# Patient Record
Sex: FEMALE | Race: WHITE | Employment: OTHER | URBAN - METROPOLITAN AREA
[De-identification: names, ages, dates, MRNs, and addresses within clinical notes are randomized per-mention and may not be internally consistent; named-entity substitution may affect disease eponyms.]

---

## 2017-03-15 ENCOUNTER — OFFICE VISIT (OUTPATIENT)
Dept: PEDIATRICS | Facility: CLINIC | Age: 57
End: 2017-03-15
Payer: COMMERCIAL

## 2017-03-15 VITALS
HEART RATE: 71 BPM | SYSTOLIC BLOOD PRESSURE: 138 MMHG | WEIGHT: 188 LBS | HEIGHT: 65 IN | DIASTOLIC BLOOD PRESSURE: 84 MMHG | TEMPERATURE: 97.8 F | BODY MASS INDEX: 31.32 KG/M2 | OXYGEN SATURATION: 98 %

## 2017-03-15 DIAGNOSIS — L98.9 LESION OF SKIN OF FACE: Primary | ICD-10-CM

## 2017-03-15 DIAGNOSIS — N95.1 MENOPAUSAL SYNDROME (HOT FLASHES): ICD-10-CM

## 2017-03-15 PROCEDURE — 99214 OFFICE O/P EST MOD 30 MIN: CPT | Performed by: PEDIATRICS

## 2017-03-15 NOTE — PATIENT INSTRUCTIONS
Hot flashes - ok to try over the counter remedies (black cohash, primrose, flaxseed).    Derm referral below.

## 2017-03-15 NOTE — PROGRESS NOTES
"  SUBJECTIVE:                                                    Damion Stanley is a 56 year old female who presents to clinic today for the following health issues:    Derm Problem      Duration: x 2 mos    Description (location/character/radiation): brownish raised spots on cheeks and forehead - flesh colored, not itching, seems to be spreading    Intensity:  mild    Accompanying signs and symptoms: None    History (similar episodes/previous evaluation): None    Precipitating or alleviating factors: None, no change in facial products - uses daily regimen with rodan and fields.  Has also tried pore therapies, but not helping.    Therapies tried and outcome: None     Hot flashes - previously on HRT and felt great.  We have weaned off.  Still having hot flashes and hard to lose weight, exercising 6 times per week, low carb/low sugar diet.     Problem list and histories reviewed & adjusted, as indicated.  Additional history: as documented    Reviewed and updated as needed this visit by clinical staff       Reviewed and updated as needed this visit by Provider         ROS:  Constitutional, HEENT, cardiovascular, pulmonary, gi and gu systems are negative, except as otherwise noted.    OBJECTIVE:                                                    /84  Pulse 71  Temp 97.8  F (36.6  C) (Oral)  Ht 5' 4.5\" (1.638 m)  Wt 188 lb (85.3 kg)  LMP 01/07/2006  SpO2 98%  Breastfeeding? No  BMI 31.77 kg/m2  Body mass index is 31.77 kg/(m^2).  SKIN: 2mm flesh colored papules, crop of three right cheek, a few along forehead hairline and one on left chin    Diagnostic Test Results:  none      ASSESSMENT/PLAN:                                                        1. Lesion of skin of face  Do not appear ominous in any way, most c/w small sebaceous cyst - offered watchful waiting vs further dermatology consult.  - DERMATOLOGY REFERRAL    2. Menopausal syndrome (hot flashes)  Offer non HRT treatment (SSRI) - patient declines.  Would " like to try over the counter remedies - patient low risk, ok to try these.      Patient Instructions   Hot flashes - ok to try over the counter remedies (black cohash, primrose, flaxseed).    Derm referral below.      Anushka Moctezuma MD  [facility]

## 2017-03-15 NOTE — MR AVS SNAPSHOT
After Visit Summary   3/15/2017    Damion Stanley    MRN: [id number]           Patient Information     Date Of Birth          1960        Visit Information        Provider Department      3/15/2017 8:40 AM Anushka Moctezuma MD [facility]        Today's Diagnoses     Lesion of skin of face    -  1      Care Instructions    Hot flashes - ok to try over the counter remedies (black cohash, primrose, flaxseed).    Derm referral below.        Follow-ups after your visit        Additional Services     DERMATOLOGY REFERRAL       Your provider has referred you to: Dr Armenta at [facility] [phone]    Please be aware that coverage of these services is subject to the terms and limitations of your health insurance plan.  Call member services at your health plan with any benefit or coverage questions.      Please bring the following with you to your appointment:    (1) Any X-Rays, CTs or MRIs which have been performed.  Contact the facility where they were done to arrange for  prior to your scheduled appointment.    (2) List of current medications  (3) This referral request   (4) Any documents/labs given to you for this referral                  Who to contact     If you have questions or need follow up information about today's clinic visit or your schedule please contact [facility] directly at [phone].  Normal or non-critical lab and imaging results will be communicated to you by MyChart, letter or phone within 4 business days after the clinic has received the results. If you do not hear from us within 7 days, please contact the clinic through MyChart or phone. If you have a critical or abnormal lab result, we will notify you by phone as soon as possible.  Submit refill requests through [employer] or call your pharmacy and they will forward the refill request to us. Please allow 3 business days for your refill to be completed.          Additional Information  "About Your Visit        MyChart Information     [employer] gives you secure access to your electronic health record. If you see a primary care provider, you can also send messages to your care team and make appointments. If you have questions, please call your primary care clinic.  If you do not have a primary care provider, please call [phone] and they will assist you.        Care EveryWhere ID     This is your Care EveryWhere ID. This could be used by other organizations to access your [place] medical records  [id number]        Your Vitals Were     Pulse Temperature Height Last Period Pulse Oximetry Breastfeeding?    71 97.8  F (36.6  C) (Oral) 5' 4.5\" (1.638 m) 01/07/2006 98% No    BMI (Body Mass Index)                   31.77 kg/m2            Blood Pressure from Last 3 Encounters:   03/15/17 (!) 138/92   05/26/16 138/80   02/18/16 (!) 132/94    Weight from Last 3 Encounters:   03/15/17 188 lb (85.3 kg)   05/26/16 184 lb (83.5 kg)   02/18/16 185 lb 3.2 oz (84 kg)              We Performed the Following     DERMATOLOGY REFERRAL        Primary Care Provider Office Phone # Fax #    Anushka Moctezuma [phone]       [facility] [address] [place] [address]        Thank you!     Thank you for choosing [facility]  for your care. Our goal is always to provide you with excellent care. Hearing back from our patients is one way we can continue to improve our services. Please take a few minutes to complete the written survey that you may receive in the mail after your visit with us. Thank you!             Your Updated Medication List - Protect others around you: Learn how to safely use, store and throw away your medicines at www.disposemymeds.org.          This list is accurate as of: 3/15/17  9:10 AM.  Always use your most recent med list.                   Brand Name Dispense Instructions for use    amLODIPine 5 MG tablet    NORVASC    90 tablet    Take 1 " tablet (5 mg) by mouth daily       calcium carbonate 600 MG tablet   Generic drug:  calcium      Take 1 tablet by mouth. Take one tablet daily with Breakfast       MULTI-VITAMIN DAILY PO      Take  by mouth.       Vitamin D-3 5000 UNITS Tabs      Take 1 tablet by mouth. One tablet daily

## 2017-03-15 NOTE — NURSING NOTE
"Chief Complaint   Patient presents with     Derm Problem       Initial BP (!) 138/92 (BP Location: Right arm, Patient Position: Chair, Cuff Size: Adult Regular)  Pulse 71  Temp 97.8  F (36.6  C) (Oral)  Ht 5' 4.5\" (1.638 m)  Wt 188 lb (85.3 kg)  LMP 01/07/2006  SpO2 98%  Breastfeeding? No  BMI 31.77 kg/m2 Estimated body mass index is 31.77 kg/(m^2) as calculated from the following:    Height as of this encounter: 5' 4.5\" (1.638 m).    Weight as of this encounter: 188 lb (85.3 kg).  Medication Reconciliation: complete  "

## 2017-04-10 ENCOUNTER — OFFICE VISIT (OUTPATIENT)
Dept: PEDIATRICS | Facility: CLINIC | Age: 57
End: 2017-04-10
Payer: COMMERCIAL

## 2017-04-10 VITALS
DIASTOLIC BLOOD PRESSURE: 78 MMHG | OXYGEN SATURATION: 96 % | HEIGHT: 65 IN | TEMPERATURE: 98.3 F | SYSTOLIC BLOOD PRESSURE: 120 MMHG | BODY MASS INDEX: 31.67 KG/M2 | HEART RATE: 82 BPM | WEIGHT: 190.1 LBS

## 2017-04-10 DIAGNOSIS — N63.0 LUMP OR MASS IN BREAST: Primary | ICD-10-CM

## 2017-04-10 PROCEDURE — 99213 OFFICE O/P EST LOW 20 MIN: CPT | Performed by: INTERNAL MEDICINE

## 2017-04-10 NOTE — MR AVS SNAPSHOT
After Visit Summary   4/10/2017    Damion Stanley    MRN: [id number]           Patient Information     Date Of Birth          1960        Visit Information        Provider Department      4/10/2017 10:20 AM Cam Herring MD [facility]        Today's Diagnoses     Lump or mass in breast    -  1      Care Instructions    If you are not contacted you can call radiology [phone]          Follow-ups after your visit        Your next 10 appointments already scheduled     May 02, 2017  9:30 AM CDT   New Visit with Michelle Armenta MD   [facility]    [address]  [address]  [facility] [address] [phone]              Future tests that were ordered for you today     Open Future Orders        Priority Expected Expires Ordered    MA Diagnostic Left w/Sravan Routine  4/10/2018 4/10/2017            Who to contact     If you have questions or need follow up information about today's clinic visit or your schedule please contact [facility] directly at [phone].  Normal or non-critical lab and imaging results will be communicated to you by MyChart, letter or phone within 4 business days after the clinic has received the results. If you do not hear from us within 7 days, please contact the clinic through [employer] or phone. If you have a critical or abnormal lab result, we will notify you by phone as soon as possible.  Submit refill requests through [employer] or call your pharmacy and they will forward the refill request to us. Please allow 3 business days for your refill to be completed.          Additional Information About Your Visit        MyChart Information     [employer] gives you secure access to your electronic health record. If you see a primary care provider, you can also send messages to your care team and make appointments. If you have questions, please call your primary care clinic.  If you do not  "have a primary care provider, please call [phone] and they will assist you.        Care EveryWhere ID     This is your Care EveryWhere ID. This could be used by other organizations to access your [place] medical records  [id number]        Your Vitals Were     Pulse Temperature Height Last Period Pulse Oximetry BMI (Body Mass Index)    82 98.3  F (36.8  C) (Oral) 5' 4.5\" (1.638 m) 01/07/2006 96% 32.13 kg/m2       Blood Pressure from Last 3 Encounters:   04/10/17 120/78   03/15/17 138/84   05/26/16 138/80    Weight from Last 3 Encounters:   04/10/17 190 lb 1.6 oz (86.2 kg)   03/15/17 188 lb (85.3 kg)   05/26/16 184 lb (83.5 kg)               Primary Care Provider Office Phone # Fax #    Anushka Moctezuma [phone]       [facility] [address] [place] [address] [address] [place] [address]        Thank you!     Thank you for choosing [facility]  for your care. Our goal is always to provide you with excellent care. Hearing back from our patients is one way we can continue to improve our services. Please take a few minutes to complete the written survey that you may receive in the mail after your visit with us. Thank you!             Your Updated Medication List - Protect others around you: Learn how to safely use, store and throw away your medicines at www.disposemymeds.org.          This list is accurate as of: 4/10/17 10:44 AM.  Always use your most recent med list.                   Brand Name Dispense Instructions for use    amLODIPine 5 MG tablet    NORVASC    90 tablet    Take 1 tablet (5 mg) by mouth daily       calcium carbonate 600 MG tablet   Generic drug:  calcium      Take 1 tablet by mouth. Take one tablet daily with Breakfast       MULTI-VITAMIN DAILY PO      Take  by mouth.       Vitamin D-3 5000 UNITS Tabs      Take 1 tablet by mouth. One tablet daily         "

## 2017-04-10 NOTE — NURSING NOTE
"Chief Complaint   Patient presents with     Breast Problem       Initial /78 (Cuff Size: Adult Large)  Pulse 82  Temp 98.3  F (36.8  C) (Oral)  Ht 5' 4.5\" (1.638 m)  Wt 190 lb 1.6 oz (86.2 kg)  LMP 01/07/2006  SpO2 96%  BMI 32.13 kg/m2 Estimated body mass index is 32.13 kg/(m^2) as calculated from the following:    Height as of this encounter: 5' 4.5\" (1.638 m).    Weight as of this encounter: 190 lb 1.6 oz (86.2 kg).  Medication Reconciliation: zia Quinteros   "

## 2017-04-10 NOTE — PROGRESS NOTES
"  SUBJECTIVE:                                                    Damion Stanley is a 56 year old female who presents to clinic today for the following health issues:      Concern - Left breast lump     Onset: yesterday    Description:   lump    Progression of Symptoms:  same    Accompanying Signs & Symptoms:  None       Previous history of similar problem:   Had cyst on left breast previously.     Precipitating factors:   Worsened by: none    Alleviating factors:  Improved by: none       Problem list and histories reviewed & adjusted, as indicated.      OBJECTIVE:                                                    /78 (Cuff Size: Adult Large)  Pulse 82  Temp 98.3  F (36.8  C) (Oral)  Ht 5' 4.5\" (1.638 m)  Wt 190 lb 1.6 oz (86.2 kg)  LMP 01/07/2006  SpO2 96%  BMI 32.13 kg/m2  Body mass index is 32.13 kg/(m^2).  GEN: no distress  SKIN: no rashes or suspicious lesions noted  BREAST: Right breast w/o masses or skin lesions. No adenopathy noted. Left breast with rubbery mass in the lower aspect just medial to midline. Mobile. Nontender. No discharge. No associated skin lesions.        ASSESSMENT/PLAN:                                                        ICD-10-CM    1. Lump or mass in breast N63 MA Diagnostic Left w/Sravan      Clinically most c/w fibrous change or possibly cyst.     Mammogram/US ordered:    Recent Results (from the past 744 hour(s))   MA Diagnostic Left w/Sravan    Narrative    MAMMOGRAPHY DIAGNOSTIC LEFT WITH TOMOSYNTHESIS, DIGITAL w/CAD;      TARGETED ULTRASOUND, LEFT BREAST - 4/12/2017 1:46 PM    HISTORY: New left lower inner breast lump. Family history of breast  cancer in her mother at age 54.     COMPARISON:  Mammograms dated 10/5/2016, 4/2/2015, 1/16/2014 and  1/9/2013.    BREAST DENSITY: Scattered fibroglandular densities.    FINDINGS: No mammographic findings of concern for malignancy.    Targeted ultrasound of the lower inner left breast at the site of the  palpable finding " demonstrates no sonographic abnormality to suggest  malignancy.       Impression    IMPRESSION: BI-RADS CATEGORY: 1 -  NEGATIVE.    RECOMMENDED FOLLOW-UP: Clinical Follow-up. Annual mammography.    I discussed the findings and recommendations with the patient at the  time of the exam.    TORI MAIN MD   US Breast Left    Narrative    MAMMOGRAPHY DIAGNOSTIC LEFT WITH TOMOSYNTHESIS, DIGITAL w/CAD;      TARGETED ULTRASOUND, LEFT BREAST - 4/12/2017 1:46 PM    HISTORY: New left lower inner breast lump. Family history of breast  cancer in her mother at age 54.     COMPARISON:  Mammograms dated 10/5/2016, 4/2/2015, 1/16/2014 and  1/9/2013.    BREAST DENSITY: Scattered fibroglandular densities.    FINDINGS: No mammographic findings of concern for malignancy.    Targeted ultrasound of the lower inner left breast at the site of the  palpable finding demonstrates no sonographic abnormality to suggest  malignancy.       Impression    IMPRESSION: BI-RADS CATEGORY: 1 -  NEGATIVE.    RECOMMENDED FOLLOW-UP: Clinical Follow-up. Annual mammography.    I discussed the findings and recommendations with the patient at the  time of the exam.    MD Cam MATTHEW MD  [facility]

## 2017-04-12 ENCOUNTER — HOSPITAL ENCOUNTER (OUTPATIENT)
Dept: MAMMOGRAPHY | Facility: CLINIC | Age: 57
Discharge: HOME | End: 2017-04-12
Attending: INTERNAL MEDICINE | Admitting: INTERNAL MEDICINE
Payer: COMMERCIAL

## 2017-04-12 ENCOUNTER — HOSPITAL ENCOUNTER (OUTPATIENT)
Dept: MAMMOGRAPHY | Facility: CLINIC | Age: 57
End: 2017-04-12
Attending: INTERNAL MEDICINE
Payer: COMMERCIAL

## 2017-04-12 DIAGNOSIS — N63.0 LUMP OR MASS IN BREAST: ICD-10-CM

## 2017-04-12 PROCEDURE — 76642 ULTRASOUND BREAST LIMITED: CPT | Mod: LT

## 2017-04-12 PROCEDURE — G0279 TOMOSYNTHESIS, MAMMO: HCPCS

## 2017-04-24 ENCOUNTER — MYC MEDICAL ADVICE (OUTPATIENT)
Dept: PEDIATRICS | Facility: CLINIC | Age: 57
End: 2017-04-24

## 2017-04-24 DIAGNOSIS — N95.1 MENOPAUSAL SYNDROME (HOT FLASHES): Primary | ICD-10-CM

## 2017-04-24 RX ORDER — CITALOPRAM HYDROBROMIDE 20 MG/1
20 TABLET ORAL DAILY
Qty: 90 TABLET | Refills: 1 | Status: SHIPPED | OUTPATIENT
Start: 2017-04-24 | End: 2018-09-19

## 2017-05-02 ENCOUNTER — OFFICE VISIT (OUTPATIENT)
Dept: DERMATOLOGY | Facility: CLINIC | Age: 57
End: 2017-05-02
Payer: COMMERCIAL

## 2017-05-02 DIAGNOSIS — L73.8 SENILE SEBACEOUS GLAND HYPERPLASIA: ICD-10-CM

## 2017-05-02 DIAGNOSIS — D23.9 DERMAL NEVUS: ICD-10-CM

## 2017-05-02 DIAGNOSIS — L01.00 IMPETIGO: Primary | ICD-10-CM

## 2017-05-02 PROCEDURE — 99243 OFF/OP CNSLTJ NEW/EST LOW 30: CPT | Performed by: DERMATOLOGY

## 2017-05-02 RX ORDER — MUPIROCIN 20 MG/G
OINTMENT TOPICAL
Qty: 22 G | Refills: 1 | Status: SHIPPED | OUTPATIENT
Start: 2017-05-02 | End: 2018-09-19

## 2017-05-02 NOTE — NURSING NOTE
"Chief Complaint   Patient presents with     Consult     Small lesions on face started after Tresa, lesions on face are spreading all over, one spot scabs over never fully heal, tx: none        Initial LMP 01/07/2006 Estimated body mass index is 32.13 kg/(m^2) as calculated from the following:    Height as of 4/10/17: 5' 4.5\" (163.8 cm).    Weight as of 4/10/17: 190 lb 1.6 oz (86.2 kg).  Medication Reconciliation: complete   Katharine Valente MA      "

## 2017-05-02 NOTE — PATIENT INSTRUCTIONS
Pediatric Dermatology  [facility]  [address]  1st [place]  [address]  Phone: [phone]    Pediatric & Adult Dermatology  [facility]  [address]   2nd Floor  [facility] [address]  [phone]                  General information: Dr. Michelle Armenta is a board-certified dermatologist with subspecialty certification in pediatric dermatology.     Scheduling and Nurse Triage: Dr. Armenta sees pediatric patients on Mondays in [place] and adult and pediatric patients on Tuesdays in [place]. The remainder of the week she practices at the [facility]. Please call the above phone numbers to schedule or to talk to a nurse.     -For scheduling at the [place] or [place] locations, or to talk to the triage nurse please call the above phone number at the clinic where you were seen.     -For medication refills, please call your pharmacy.

## 2017-05-02 NOTE — MR AVS SNAPSHOT
After Visit Summary   5/2/2017    Damion Stanley    MRN: [id number]           Patient Information     Date Of Birth          1960        Visit Information        Provider Department      5/2/2017 9:30 AM Michelle Armenta MD [facility]        Today's Diagnoses     Impetigo    -  1    Senile sebaceous gland hyperplasia        Dermal nevus          Care Instructions                    Pediatric Dermatology  [facility]  [address]  1st [place]  [address]  Phone: [phone]    Pediatric & Adult Dermatology  [facility]  [address]  2nd Floor  [facility] [address]  [phone]                  General information: Dr. Michelle Armenta is a board-certified dermatologist with subspecialty certification in pediatric dermatology.     Scheduling and Nurse Triage: Dr. Armenta sees pediatric patients on Mondays in [place] and adult and pediatric patients on Tuesdays in [place]. The remainder of the week she practices at the [facility]. Please call the above phone numbers to schedule or to talk to a nurse.     -For scheduling at the [place] or [place] locations, or to talk to the triage nurse please call the above phone number at the clinic where you were seen.     -For medication refills, please call your pharmacy.                   Follow-ups after your visit        Who to contact     If you have questions or need follow up information about today's clinic visit or your schedule please contact [facility] directly at [phone].  Normal or non-critical lab and imaging results will be communicated to you by MyChart, letter or phone within 4 business days after the clinic has received the results. If you do not hear from us within 7 days, please contact the clinic through MyChart or phone. If you have a critical or abnormal lab result,  we will notify you by phone as soon as possible.  Submit refill requests through [employer] or call your pharmacy and they will forward the refill request to us. Please allow 3 business days for your refill to be completed.          Additional Information About Your Visit        [employer] Information     [employer] gives you secure access to your electronic health record. If you see a primary care provider, you can also send messages to your care team and make appointments. If you have questions, please call your primary care clinic.  If you do not have a primary care provider, please call [phone] and they will assist you.        Care EveryWhere ID     This is your Care EveryWhere ID. This could be used by other organizations to access your [place] medical records  [id number]        Your Vitals Were     Last Period                   01/07/2006            Blood Pressure from Last 3 Encounters:   04/10/17 120/78   03/15/17 138/84   05/26/16 138/80    Weight from Last 3 Encounters:   04/10/17 86.2 kg (190 lb 1.6 oz)   03/15/17 85.3 kg (188 lb)   05/26/16 83.5 kg (184 lb)              Today, you had the following     No orders found for display         Today's Medication Changes          These changes are accurate as of: 5/2/17 11:59 PM.  If you have any questions, ask your nurse or doctor.               Start taking these medicines.        Dose/Directions    mupirocin 2 % ointment   Commonly known as:  BACTROBAN   Used for:  Impetigo   Started by:  Michelle Armenta MD        Use nightly to chin until healed.   Quantity:  22 g   Refills:  1            Where to get your medicines      These medications were sent to [id number] - [place], [place] - [address] [place] [address] AT [address]  [address], [place] [address]     Phone:  [phone]     mupirocin 2 % ointment                Primary Care Provider Office Phone # Fax #    Anushka Moctezuma [phone]       [place]  [facility] [place] [address] [place] [address]        Thank you!     Thank you for choosing [facility]  for your care. Our goal is always to provide you with excellent care. Hearing back from our patients is one way we can continue to improve our services. Please take a few minutes to complete the written survey that you may receive in the mail after your visit with us. Thank you!             Your Updated Medication List - Protect others around you: Learn how to safely use, store and throw away your medicines at www.disposemymeds.org.          This list is accurate as of: 5/2/17 11:59 PM.  Always use your most recent med list.                   Brand Name Dispense Instructions for use    amLODIPine 5 MG tablet    NORVASC    90 tablet    Take 1 tablet (5 mg) by mouth daily       calcium carbonate 600 MG tablet   Generic drug:  calcium      Take 1 tablet by mouth. Take one tablet daily with Breakfast       citalopram 20 MG tablet    celeXA    90 tablet    Take 1 tablet (20 mg) by mouth daily       MULTI-VITAMIN DAILY PO      Take  by mouth.       mupirocin 2 % ointment    BACTROBAN    22 g    Use nightly to chin until healed.       Vitamin D-3 5000 UNITS Tabs      Take 1 tablet by mouth. One tablet daily

## 2017-05-02 NOTE — LETTER
5/2/2017      RE: Damion Stanley  [address]  [place] [address]       DERMATOLOGY CLINIC VISIT NOTE      CHIEF COMPLAINT:  Bumps on face.      DERMATOLOGY PROBLEM LIST:   1.  Sebaceous hyperplasia.   2.  Dermal nevi.   3.  Nonhealing ulcer on chin.      HISTORY OF PRESENT ILLNESS:  Ms. Stanley a 56-year-old female seen in Dermatology Clinic today at the request of Dr. Anushka Moctezuma for initial evaluation of several face lesions.  She states that over the last several years she has developed pink and tan colored bumps across the forehead, lateral cheeks and chin.  The lesions continue to form.  At one point in time, she scratched a lesion on the chin and it has not healed over the last few months.  She denies history of skin cancer or precancers.  She has no past history of lesions being removed from her skin.      The bumps on her face are asymptomatic.  She has been using a [employer] product to help cleanse her face, but this has not improved the appearance of the bumpy texture.      Patient Active Problem List   Diagnosis     Pain in joint, upper arm     Headache     CARDIOVASCULAR SCREENING; LDL GOAL LESS THAN 160     Menopause     Elevated blood pressure (not hypertension)     Vitamin D deficiency     Hormone replacement therapy (HRT)     Hypertension goal BP (blood pressure) < 140/90       Allergies   Allergen Reactions     Hydrochlorothiazide      Sweating, insomnia     Lisinopril Cough     Penicillins      rash     Tylenol W/Codeine [Acetaminophen-Codeine]      Stomach Upset         Current Outpatient Prescriptions   Medication     mupirocin (BACTROBAN) 2 % ointment     citalopram (CELEXA) 20 MG tablet     amLODIPine (NORVASC) 5 MG tablet     Cholecalciferol (VITAMIN D-3) 5000 UNITS TABS     Multiple Vitamin (MULTI-VITAMIN DAILY PO)     calcium (CALCIUM 600) 600 MG tablet     No current facility-administered medications for this visit.           REVIEW OF SYSTEMS:  Feeling well without  additional skin concerns.      SOCIAL HISTORY:  The patient is .  She lives in [place].  She is self employed.      PHYSICAL EXAMINATION:   GENERAL:  The patient is a healthy-appearing 56-year-old female in no distress.   HEENT:  Conjunctivae are clear.   PULMONARY:  Breathing comfortably on room air.   ABDOMEN:  No abdominal distention.   SKIN:  Examination was  focused on the face, neck and dorsal hands today.  Examination of the face showed approximately 20 white and yellow smooth 2-4 mm papules, many with central dell and some with keratinous plugs centrally.   -- Scattered, firm, flesh-colored papules on the lateral cheeks.   -- Chin with superficial erosion and yellow crusting.   -- Waxy 4 mm papule on right lateral cheek.      ASSESSMENT AND PLAN:   1.  Sebaceous hyperplasia; discussed that there was likely a hereditary component and history of sun exposure over time may also contribute to the formation of dilated sebaceous glands in the skin.  Treatment options would include chemical peel versus electrocautery.  Noted that treatment options would remove the skin elevation but would leave a pink kamryn behind.  Treatments would be considered cosmetic and would require an out-of-pocket expense.  The patient will consider options and reschedule pending her decision.     2.  Seborrheic keratosis on the cheek; benign hyperkeratotic papule.  No treatment advised.     3.  Dermal nevi on the face; no lesions of concern today.  Noted that normal change in nevi would be elevation, loss of pigmentation over time.      Ms. Stanley to return to Dermatology Clinic on an as-needed basis.      Thank you for involving me in was Ms. Stanley's care.     Michelle Armenta MD  Dermatology Staff       cc:   Anushka Moctezuma MD   [facility]   [address] [place] [address]        D: 05/02/2017 12:46   T: 05/02/2017 18:45   MT: SANDY      Name:     LYNDSEY STANLEY   MRN:      [id number]        Account:       [id number]   :      1960           Service Date: 2017      Document: [id number]

## 2017-05-02 NOTE — PROGRESS NOTES
DERMATOLOGY CLINIC VISIT NOTE      CHIEF COMPLAINT:  Bumps on face.      DERMATOLOGY PROBLEM LIST:   1.  Sebaceous hyperplasia.   2.  Dermal nevi.   3.  Nonhealing ulcer on chin.      HISTORY OF PRESENT ILLNESS:  Ms. Stanley a 56-year-old female seen in Dermatology Clinic today at the request of Dr. Anushka Moctezuma for initial evaluation of several face lesions.  She states that over the last several years she has developed pink and tan colored bumps across the forehead, lateral cheeks and chin.  The lesions continue to form.  At one point in time, she scratched a lesion on the chin and it has not healed over the last few months.  She denies history of skin cancer or precancers.  She has no past history of lesions being removed from her skin.      The bumps on her face are asymptomatic.  She has been using a [employer] product to help cleanse her face, but this has not improved the appearance of the bumpy texture.      Patient Active Problem List   Diagnosis     Pain in joint, upper arm     Headache     CARDIOVASCULAR SCREENING; LDL GOAL LESS THAN 160     Menopause     Elevated blood pressure (not hypertension)     Vitamin D deficiency     Hormone replacement therapy (HRT)     Hypertension goal BP (blood pressure) < 140/90       Allergies   Allergen Reactions     Hydrochlorothiazide      Sweating, insomnia     Lisinopril Cough     Penicillins      rash     Tylenol W/Codeine [Acetaminophen-Codeine]      Stomach Upset         Current Outpatient Prescriptions   Medication     mupirocin (BACTROBAN) 2 % ointment     citalopram (CELEXA) 20 MG tablet     amLODIPine (NORVASC) 5 MG tablet     Cholecalciferol (VITAMIN D-3) 5000 UNITS TABS     Multiple Vitamin (MULTI-VITAMIN DAILY PO)     calcium (CALCIUM 600) 600 MG tablet     No current facility-administered medications for this visit.           REVIEW OF SYSTEMS:  Feeling well without additional skin concerns.      SOCIAL HISTORY:  The patient is .  She lives in  [place].  She is self employed.      PHYSICAL EXAMINATION:   GENERAL:  The patient is a healthy-appearing 56-year-old female in no distress.   HEENT:  Conjunctivae are clear.   PULMONARY:  Breathing comfortably on room air.   ABDOMEN:  No abdominal distention.   SKIN:  Examination was  focused on the face, neck and dorsal hands today.  Examination of the face showed approximately 20 white and yellow smooth 2-4 mm papules, many with central dell and some with keratinous plugs centrally.   -- Scattered, firm, flesh-colored papules on the lateral cheeks.   -- Chin with superficial erosion and yellow crusting.   -- Waxy 4 mm papule on right lateral cheek.      ASSESSMENT AND PLAN:   1.  Sebaceous hyperplasia; discussed that there was likely a hereditary component and history of sun exposure over time may also contribute to the formation of dilated sebaceous glands in the skin.  Treatment options would include chemical peel versus electrocautery.  Noted that treatment options would remove the skin elevation but would leave a pink kamryn behind.  Treatments would be considered cosmetic and would require an out-of-pocket expense.  The patient will consider options and reschedule pending her decision.     2.  Seborrheic keratosis on the cheek; benign hyperkeratotic papule.  No treatment advised.     3.  Dermal nevi on the face; no lesions of concern today.  Noted that normal change in nevi would be elevation, loss of pigmentation over time.      Ms. Duarte to return to Dermatology Clinic on an as-needed basis.      Thank you for involving me in was Ms. Duarte's care.     Michelle Armenta MD  Dermatology Staff       cc:   Anushka Moctezuma MD   [facility]   [address] [place] [address]         MICHELLE ARMENTA MD             D: 2017 12:46   T: 2017 18:45   MT: SANDY      Name:     LYNDSEY DUARTE   MRN:      [id number]        Account:      [id number]   :      1960            Service Date: 05/02/2017      Document: [id number]

## 2017-05-04 PROBLEM — L73.8 SENILE SEBACEOUS GLAND HYPERPLASIA: Status: ACTIVE | Noted: 2017-05-04

## 2017-05-04 PROBLEM — L01.00 IMPETIGO: Status: ACTIVE | Noted: 2017-05-04

## 2017-05-04 PROBLEM — D23.9 DERMAL NEVUS: Status: ACTIVE | Noted: 2017-05-04

## 2017-05-08 ENCOUNTER — MYC MEDICAL ADVICE (OUTPATIENT)
Dept: PEDIATRICS | Facility: CLINIC | Age: 57
End: 2017-05-08

## 2017-05-22 DIAGNOSIS — I10 BENIGN ESSENTIAL HYPERTENSION: ICD-10-CM

## 2017-05-22 NOTE — TELEPHONE ENCOUNTER
AMLODIPINE 5MG      Last Written Prescription Date: 5/26/2016  Last Fill Quantity: 90, # refills: 3    Last Office Visit with [facility], [facility] or [facility] prescribing provider:  4/10/2017   Future Office Visit:        BP Readings from Last 3 Encounters:   04/10/17 120/78   03/15/17 138/84   05/26/16 138/80

## 2017-05-23 RX ORDER — AMLODIPINE BESYLATE 5 MG/1
5 TABLET ORAL DAILY
Qty: 90 TABLET | Refills: 2 | Status: SHIPPED | OUTPATIENT
Start: 2017-05-23 | End: 2018-02-14

## 2017-08-23 ENCOUNTER — OFFICE VISIT (OUTPATIENT)
Dept: PEDIATRICS | Facility: CLINIC | Age: 57
End: 2017-08-23
Payer: COMMERCIAL

## 2017-08-23 VITALS
DIASTOLIC BLOOD PRESSURE: 84 MMHG | WEIGHT: 189 LBS | HEIGHT: 65 IN | BODY MASS INDEX: 31.49 KG/M2 | TEMPERATURE: 97.8 F | OXYGEN SATURATION: 98 % | SYSTOLIC BLOOD PRESSURE: 120 MMHG | HEART RATE: 71 BPM

## 2017-08-23 DIAGNOSIS — N89.8 VAGINAL DRYNESS: ICD-10-CM

## 2017-08-23 DIAGNOSIS — Z00.00 ROUTINE GENERAL MEDICAL EXAMINATION AT A HEALTH CARE FACILITY: Primary | ICD-10-CM

## 2017-08-23 DIAGNOSIS — Z13.220 LIPID SCREENING: ICD-10-CM

## 2017-08-23 DIAGNOSIS — Z13.1 SCREENING FOR DIABETES MELLITUS: ICD-10-CM

## 2017-08-23 LAB
CHOLEST SERPL-MCNC: 202 MG/DL
GLUCOSE SERPL-MCNC: 84 MG/DL (ref 70–99)
HDLC SERPL-MCNC: 112 MG/DL
LDLC SERPL CALC-MCNC: 77 MG/DL
NONHDLC SERPL-MCNC: 90 MG/DL
TRIGL SERPL-MCNC: 66 MG/DL

## 2017-08-23 PROCEDURE — 36415 COLL VENOUS BLD VENIPUNCTURE: CPT | Performed by: PEDIATRICS

## 2017-08-23 PROCEDURE — 82947 ASSAY GLUCOSE BLOOD QUANT: CPT | Performed by: PEDIATRICS

## 2017-08-23 PROCEDURE — 99212 OFFICE O/P EST SF 10 MIN: CPT | Mod: 25 | Performed by: PEDIATRICS

## 2017-08-23 PROCEDURE — 80061 LIPID PANEL: CPT | Performed by: PEDIATRICS

## 2017-08-23 PROCEDURE — 99396 PREV VISIT EST AGE 40-64: CPT | Performed by: PEDIATRICS

## 2017-08-23 RX ORDER — ESTRADIOL 0.1 MG/G
2 CREAM VAGINAL
Qty: 42.5 G | Refills: 11 | Status: SHIPPED | OUTPATIENT
Start: 2017-08-23 | End: 2018-09-19

## 2017-08-23 NOTE — PATIENT INSTRUCTIONS
Labs today.    Start using vaginal estrogen cream      Preventive Health Recommendations  Female Ages 50 - 64    Yearly exam: See your health care provider every year in order to  o Review health changes.   o Discuss preventive care.    o Review your medicines if your doctor has prescribed any.      Get a Pap test every three years (unless you have an abnormal result and your provider advises testing more often).    If you get Pap tests with HPV test, you only need to test every 5 years, unless you have an abnormal result.     You do not need a Pap test if your uterus was removed (hysterectomy) and you have not had cancer.    You should be tested each year for STDs (sexually transmitted diseases) if you're at risk.     Have a mammogram every 1 to 2 years.    Have a colonoscopy at age 50, or have a yearly FIT test (stool test). These exams screen for colon cancer.      Have a cholesterol test every 5 years, or more often if advised.    Have a diabetes test (fasting glucose) every three years. If you are at risk for diabetes, you should have this test more often.     If you are at risk for osteoporosis (brittle bone disease), think about having a bone density scan (DEXA).    Shots: Get a flu shot each year. Get a tetanus shot every 10 years.    Nutrition:     Eat at least 5 servings of fruits and vegetables each day.    Eat whole-grain bread, whole-wheat pasta and brown rice instead of white grains and rice.    Talk to your provider about Calcium and Vitamin D.     Lifestyle    Exercise at least 150 minutes a week (30 minutes a day, 5 days a week). This will help you control your weight and prevent disease.    Limit alcohol to one drink per day.    No smoking.     Wear sunscreen to prevent skin cancer.     See your dentist every six months for an exam and cleaning.    See your eye doctor every 1 to 2 years.

## 2017-08-23 NOTE — PROGRESS NOTES
SUBJECTIVE:   CC: Damion Stanley is an 56 year old woman who presents for preventive health visit.     Physical   Annual:     Getting at least 3 servings of Calcium per day::  Yes    Bi-annual eye exam::  Yes    Dental care twice a year::  Yes    Sleep apnea or symptoms of sleep apnea::  None    Diet::  Carbohydrate counting    Frequency of exercise::  4-5 days/week    Duration of exercise::  45-60 minutes    Taking medications regularly::  Yes    Additional concerns today::  YES    Other concerns:  1. Pain with intercourse - vaginal dryness.  Has tried lubrication, but doesn't help as much as she would like. No vaginal bleeding.    Today's PHQ-2 Score:   PHQ-2 ( 1999 Pfizer) 8/23/2017   Q1: Little interest or pleasure in doing things 0   Q2: Feeling down, depressed or hopeless 0   PHQ-2 Score 0   Q1: Little interest or pleasure in doing things Not at all   Q2: Feeling down, depressed or hopeless Not at all   PHQ-2 Score 0       Abuse: Current or Past(Physical, Sexual or Emotional)- No  Do you feel safe in your environment - Yes    Social History   Substance Use Topics     Smoking status: Never Smoker     Smokeless tobacco: Never Used     Alcohol use 0.0 oz/week     0 Standard drinks or equivalent per week      Comment: glass of wine occasionally     The patient does not drink >3 drinks per day nor >7 drinks per week.    Reviewed orders with patient.  Reviewed health maintenance and updated orders accordingly - Yes  BP Readings from Last 3 Encounters:   08/23/17 120/84   04/10/17 120/78   03/15/17 138/84    Wt Readings from Last 3 Encounters:   08/23/17 189 lb (85.7 kg)   04/10/17 190 lb 1.6 oz (86.2 kg)   03/15/17 188 lb (85.3 kg)                      Patient over age 50, mutual decision to screen reflected in health maintenance.      Pertinent mammograms are reviewed under the imaging tab.  History of abnormal Pap smear: NO - age 30-65 PAP every 5 years with negative HPV co-testing recommended    Reviewed and  "updated as needed this visit by clinical staff  Tobacco  Allergies  Meds  Med Hx  Surg Hx  Fam Hx  Soc Hx        Reviewed and updated as needed this visit by Provider            ROS:  C: NEGATIVE for fever, chills, change in weight  I: NEGATIVE for worrisome rashes, moles or lesions  E: NEGATIVE for vision changes or irritation  ENT: NEGATIVE for ear, mouth and throat problems  R: NEGATIVE for significant cough or SOB  B: NEGATIVE for masses, tenderness or discharge  CV: NEGATIVE for chest pain, palpitations or peripheral edema  GI: NEGATIVE for nausea, abdominal pain, heartburn, or change in bowel habits  : hpi  M: NEGATIVE for significant arthralgias or myalgia  N: NEGATIVE for weakness, dizziness or paresthesias  P: NEGATIVE for changes in mood or affect      OBJECTIVE:   /84 (BP Location: Right arm, Cuff Size: Adult Large)  Pulse 71  Temp 97.8  F (36.6  C) (Oral)  Ht 5' 4.5\" (1.638 m)  Wt 189 lb (85.7 kg)  LMP 01/07/2006  SpO2 98%  BMI 31.94 kg/m2  EXAM:  GENERAL APPEARANCE: healthy, alert and no distress  EYES: Eyes grossly normal to inspection, PERRL and conjunctivae and sclerae normal  HENT: ear canals and TM's normal, nose and mouth without ulcers or lesions, oropharynx clear and oral mucous membranes moist  NECK: no adenopathy, no asymmetry, masses, or scars and thyroid normal to palpation  RESP: lungs clear to auscultation - no rales, rhonchi or wheezes  BREAST: normal without masses, tenderness or nipple discharge and no palpable axillary masses or adenopathy  CV: regular rate and rhythm, normal S1 S2, no S3 or S4, no murmur, click or rub, no peripheral edema and peripheral pulses strong  ABDOMEN: soft, nontender, no hepatosplenomegaly, no masses and bowel sounds normal  MS: no musculoskeletal defects are noted and gait is age appropriate without ataxia  SKIN: no suspicious lesions or rashes  NEURO: Normal strength and tone, sensory exam grossly normal, mentation intact and speech " "normal  PSYCH: mentation appears normal and affect normal/bright    ASSESSMENT/PLAN:   1. Routine general medical examination at a health care facility    2. Vaginal dryness  Recommend over the counter lubrication in addition to starting vaginal estrogen  - estradiol (ESTRACE) 0.1 MG/GM cream; Place 2 g vaginally three times a week  Dispense: 42.5 g; Refill: 11  - OFFICE/OUTPT VISIT,ESTLEVL II    3. Lipid screening  - Lipid Profile (Chol, Trig, HDL, LDL calc)    4. Screening for diabetes mellitus  - Glucose    COUNSELING:  Reviewed preventive health counseling, as reflected in patient instructions         reports that she has never smoked. She has never used smokeless tobacco.    Estimated body mass index is 31.94 kg/(m^2) as calculated from the following:    Height as of this encounter: 5' 4.5\" (1.638 m).    Weight as of this encounter: 189 lb (85.7 kg).   Weight management plan: Discussed healthy diet and exercise guidelines and patient will follow up in 12 in clinic to re-evaluate. Pt has a great diet and plenty of exercise.  Labwork looks great. Discussed different body set points and importance of keeping weight stable.    Counseling Resources:  ATP IV Guidelines  Pooled Cohorts Equation Calculator  Breast Cancer Risk Calculator  FRAX Risk Assessment  ICSI Preventive Guidelines  Dietary Guidelines for Americans, 2010  USDA's MyPlate  ASA Prophylaxis  Lung CA Screening    Anushka Moctezuma MD  [facility]  "

## 2017-08-23 NOTE — NURSING NOTE
"Chief Complaint   Patient presents with     Physical       Initial Ht 5' 4.5\" (1.638 m)  Wt 189 lb (85.7 kg)  LMP 01/07/2006  BMI 31.94 kg/m2 Estimated body mass index is 31.94 kg/(m^2) as calculated from the following:    Height as of this encounter: 5' 4.5\" (1.638 m).    Weight as of this encounter: 189 lb (85.7 kg).  Medication Reconciliation: complete Saba Ahumada MA    "

## 2017-08-23 NOTE — MR AVS SNAPSHOT
After Visit Summary   8/23/2017    Damion Stanley    MRN: [id number]           Patient Information     Date Of Birth          1960        Visit Information        Provider Department      8/23/2017 9:40 AM Anushka Moctezuma MD [facility]        Today's Diagnoses     Lipid screening    -  1    Screening for diabetes mellitus        Vaginal dryness          Care Instructions    Labs today.    Start using vaginal estrogen cream      Preventive Health Recommendations  Female Ages 50 - 64    Yearly exam: See your health care provider every year in order to  o Review health changes.   o Discuss preventive care.    o Review your medicines if your doctor has prescribed any.      Get a Pap test every three years (unless you have an abnormal result and your provider advises testing more often).    If you get Pap tests with HPV test, you only need to test every 5 years, unless you have an abnormal result.     You do not need a Pap test if your uterus was removed (hysterectomy) and you have not had cancer.    You should be tested each year for STDs (sexually transmitted diseases) if you're at risk.     Have a mammogram every 1 to 2 years.    Have a colonoscopy at age 50, or have a yearly FIT test (stool test). These exams screen for colon cancer.      Have a cholesterol test every 5 years, or more often if advised.    Have a diabetes test (fasting glucose) every three years. If you are at risk for diabetes, you should have this test more often.     If you are at risk for osteoporosis (brittle bone disease), think about having a bone density scan (DEXA).    Shots: Get a flu shot each year. Get a tetanus shot every 10 years.    Nutrition:     Eat at least 5 servings of fruits and vegetables each day.    Eat whole-grain bread, whole-wheat pasta and brown rice instead of white grains and rice.    Talk to your provider about Calcium and Vitamin D.     Lifestyle    Exercise at least 150 minutes a  "week (30 minutes a day, 5 days a week). This will help you control your weight and prevent disease.    Limit alcohol to one drink per day.    No smoking.     Wear sunscreen to prevent skin cancer.     See your dentist every six months for an exam and cleaning.    See your eye doctor every 1 to 2 years.            Follow-ups after your visit        Who to contact     If you have questions or need follow up information about today's clinic visit or your schedule please contact [facility] directly at [phone].  Normal or non-critical lab and imaging results will be communicated to you by Big Thinkhart, letter or phone within 4 business days after the clinic has received the results. If you do not hear from us within 7 days, please contact the clinic through Admazely or phone. If you have a critical or abnormal lab result, we will notify you by phone as soon as possible.  Submit refill requests through Admazely or call your pharmacy and they will forward the refill request to us. Please allow 3 business days for your refill to be completed.          Additional Information About Your Visit        Big ThinkharMonitor My Meds Information     Admazely gives you secure access to your electronic health record. If you see a primary care provider, you can also send messages to your care team and make appointments. If you have questions, please call your primary care clinic.  If you do not have a primary care provider, please call [phone] and they will assist you.        Care EveryWhere ID     This is your Care EveryWhere ID. This could be used by other organizations to access your [place] medical records  [id number]        Your Vitals Were     Pulse Temperature Height Last Period Pulse Oximetry BMI (Body Mass Index)    71 97.8  F (36.6  C) (Oral) 5' 4.5\" (1.638 m) 01/07/2006 98% 31.94 kg/m2       Blood Pressure from Last 3 Encounters:   08/23/17 120/84   04/10/17 120/78   03/15/17 138/84    Weight from Last 3 Encounters:   08/23/17 " 189 lb (85.7 kg)   04/10/17 190 lb 1.6 oz (86.2 kg)   03/15/17 188 lb (85.3 kg)              We Performed the Following     Glucose     Lipid Profile (Chol, Trig, HDL, LDL calc)          Today's Medication Changes          These changes are accurate as of: 8/23/17 10:35 AM.  If you have any questions, ask your nurse or doctor.               Start taking these medicines.        Dose/Directions    estradiol 0.1 MG/GM cream   Commonly known as:  ESTRACE   Used for:  Vaginal dryness   Started by:  Anushka Moctezuma MD        Dose:  2 g   Place 2 g vaginally three times a week   Quantity:  42.5 g   Refills:  11            Where to get your medicines      These medications were sent to [id number] - [place], [place] - [address] AT [address]  [address], [place] [address]     Phone:  [phone]     estradiol 0.1 MG/GM cream                Primary Care Provider Office Phone # Fax #    Anushka Moctezuma [phone] [address] [place] [address]        Equal Access to Services     [facility] AH: Hadii aad ku hadasho Soomaali, waaxda luqadaha, qaybta kaalmada adeegyada, waxay idiin hayaan danielle moss . So [facility] [phone].    ATENCIÓN: Si habla español, tiene a singer disposición servicios gratuitos de asistencia lingüística. Llame al [phone].    We comply with applicable federal civil rights laws and [place] laws. We do not discriminate on the basis of race, color, national origin, age, disability sex, sexual orientation or gender identity.            Thank you!     [facility] you [facility] choosing [facility]  [facility] your care. Our goal is always to provide you with excellent care. Hearing back from our patients is one way we can continue to improve our services. Please take a few minutes to complete the written survey that you may receive in the mail after your visit with us. Thank you!             Your Updated Medication  List - Protect others around you: Learn how to safely use, store and throw away your medicines at www.disposemymeds.org.          This list is accurate as of: 8/23/17 10:35 AM.  Always use your most recent med list.                   Brand Name Dispense Instructions for use Diagnosis    amLODIPine 5 MG tablet    NORVASC    90 tablet    Take 1 tablet (5 mg) by mouth daily    Benign essential hypertension       calcium carbonate 600 MG tablet   Generic drug:  calcium      Take 1 tablet by mouth. Take one tablet daily with Breakfast        citalopram 20 MG tablet    celeXA    90 tablet    Take 1 tablet (20 mg) by mouth daily    Menopausal syndrome (hot flashes)       estradiol 0.1 MG/GM cream    ESTRACE    42.5 g    Place 2 g vaginally three times a week    Vaginal dryness       MULTI-VITAMIN DAILY PO      Take  by mouth.        mupirocin 2 % ointment    BACTROBAN    22 g    Use nightly to chin until healed.    Impetigo       Vitamin D-3 5000 UNITS Tabs      Take 1 tablet by mouth. One tablet daily

## 2017-11-01 ENCOUNTER — HOSPITAL ENCOUNTER (OUTPATIENT)
Dept: MAMMOGRAPHY | Facility: CLINIC | Age: 57
Discharge: HOME | End: 2017-11-01
Attending: PEDIATRICS | Admitting: PEDIATRICS
Payer: COMMERCIAL

## 2017-11-01 DIAGNOSIS — Z12.31 ENCOUNTER FOR SCREENING MAMMOGRAM FOR HIGH-RISK PATIENT: ICD-10-CM

## 2017-11-01 PROCEDURE — 77063 BREAST TOMOSYNTHESIS BI: CPT

## 2018-01-08 ENCOUNTER — TRANSFERRED RECORDS (OUTPATIENT)
Dept: HEALTH INFORMATION MANAGEMENT | Facility: CLINIC | Age: 58
End: 2018-01-08

## 2018-02-14 ENCOUNTER — TRANSFERRED RECORDS (OUTPATIENT)
Dept: HEALTH INFORMATION MANAGEMENT | Facility: CLINIC | Age: 58
End: 2018-02-14

## 2018-02-14 DIAGNOSIS — I10 BENIGN ESSENTIAL HYPERTENSION: ICD-10-CM

## 2018-02-14 NOTE — TELEPHONE ENCOUNTER
"Requested Prescriptions   Pending Prescriptions Disp Refills     amLODIPine (NORVASC) 5 MG tablet    Last Written Prescription Date:  5/23/2017  Last Fill Quantity: 90,  # refills: 2   Last office visit: 8/23/2017 with prescribing provider:  Anushka Moctezuma     Future Office Visit:     90 tablet 2     Sig: Take 1 tablet (5 mg) by mouth daily    Calcium Channel Blockers Protocol  Failed    2/14/2018  2:42 PM       Failed - Normal serum creatinine on file in past 12 months    Recent Labs   Lab Test  10/02/15   1513   CR  0.71            Passed - Blood pressure under 140/90 in past 12 months    BP Readings from Last 3 Encounters:   08/23/17 120/84   04/10/17 120/78   03/15/17 138/84                Passed - Recent or future visit with authorizing provider    Patient had office visit in the last year or has a visit in the next 30 days with authorizing provider.  See \"Patient Info\" tab in inbasket, or \"Choose Columns\" in Meds & Orders section of the refill encounter.            Passed - Patient is age 18 or older       Passed - No active pregnancy on record       Passed - No positive pregnancy test in past 12 months          "

## 2018-02-16 RX ORDER — AMLODIPINE BESYLATE 5 MG/1
5 TABLET ORAL DAILY
Qty: 90 TABLET | Refills: 1 | Status: SHIPPED | OUTPATIENT
Start: 2018-02-16 | End: 2018-05-11 | Stop reason: ALTCHOICE

## 2018-05-01 ENCOUNTER — MYC MEDICAL ADVICE (OUTPATIENT)
Dept: PEDIATRICS | Facility: CLINIC | Age: 58
End: 2018-05-01

## 2018-05-01 NOTE — TELEPHONE ENCOUNTER
Patient states that she developed insomnia and vision changes-blurry vision and wondering if these could be side effects from amlodipine.  Developed these symptoms 2-3 months ago.    Per up to date, both occur in less that 1% of patients.  advised patient of that.  Asked patient to schedule appointment with PCP.  Advised to be seen sooner with worsening symptoms.    Angelica Goel RN  Message handled by Nurse Triage.

## 2018-05-07 ENCOUNTER — OFFICE VISIT (OUTPATIENT)
Dept: PEDIATRICS | Facility: CLINIC | Age: 58
End: 2018-05-07
Payer: COMMERCIAL

## 2018-05-07 VITALS
TEMPERATURE: 97.9 F | BODY MASS INDEX: 31.99 KG/M2 | WEIGHT: 192 LBS | HEIGHT: 65 IN | OXYGEN SATURATION: 98 % | HEART RATE: 88 BPM | SYSTOLIC BLOOD PRESSURE: 138 MMHG | DIASTOLIC BLOOD PRESSURE: 86 MMHG

## 2018-05-07 DIAGNOSIS — N95.1 MENOPAUSAL SYNDROME (HOT FLASHES): ICD-10-CM

## 2018-05-07 DIAGNOSIS — G47.09 OTHER INSOMNIA: Primary | ICD-10-CM

## 2018-05-07 PROCEDURE — 99214 OFFICE O/P EST MOD 30 MIN: CPT | Performed by: PEDIATRICS

## 2018-05-07 NOTE — MR AVS SNAPSHOT
After Visit Summary   5/7/2018    Damion Stanley    MRN: [id number]           Patient Information     Date Of Birth          1960        Visit Information        Provider Department      5/7/2018 11:40 AM Anushka Moctezuma MD [facility]        Today's Diagnoses     Other insomnia    -  1    Menopausal syndrome (hot flashes)          Care Instructions        Referral for endocrine to discuss hormone issues.    In the mean time, try melatonin over the counter nightly.  Insomnia  Insomnia is repeated difficulty going to sleep or staying asleep, or both. Whether you have insomnia is not defined by a specific amount of sleep. Different people need different amounts of sleep, and you may need more or less sleep at different times of your life.  There are 3 major types of insomnia:  short-term, chronic, and  other.   Short-term, or acute insomnia lasts less than 3 months.  The symptoms are temporary and can be linked directly to a stressor, such as the death of a loved one, financial problems, or a new physical problem.  Short-term insomnia stops when the stressor resolves or the person adapts to its presence.  Chronic insomnia occurs at least 3 times a week and lasts longer than 3 months.  Chronic insomnia can occur when either the cause of the sleeping problem is not clear, or the insomnia does not get better when the stressor is resolved. A number of other criteria are also used to make the diagnosis of chronic insomnia.    Other insomnia  is the third type of insomnia-related sleep disorders.  This description applies to people who have problems getting to sleep or staying asleep, but do not meet all of the factors that describe either short-term or chronic insomnia.    Many things cause insomnia. Different people may have different causes. It can be from an underlying medical or psychological condition, or lifestyle. It can also be primary insomnia, which means no cause can be  found.  Causes of insomnia include:    Chronic medical problems- heart disease, gastrointestinal problems, hormonal changes, breathing problems    Anxiety    Stress    Depression    Pain    Work schedule    Sleep apnea    Illegal drugs    Certain medicines  Many different medidcines can affect your sleep, such as stimulants, caffeine, alcohol, some decongestants, and diet pills. Other medicines may include some types of blood pressure pills, steroids, asthma medicines, antihistamines, antidepressants, seizure medicines and statins. Not all of these will affect your sleep, and they shouldn t be stopped without talking to your doctor.  Symptoms of insomnia can include:    Lying awake for long periods at night before falling asleep    Waking up several times during the night    Waking up early in the morning and not being able to get back to sleep    Feeling tired and not refreshed by sleep    Not being able to function properly during the day and finding it hard to concentrate    Irritability    Tiredness and fatigue during the day  Home care    Review your medicines with your doctor or pharmacist to find out if they can cause insomnia. Not all medicines will affect your sleep, but they shouldn't be stopped without reviewing them with your doctor. There may be serious side effects and consequences from suddenly stopping your medicines. Not taking them may cause strokes, heart attacks, and many other problems.    Caffeine, smoking and alcohol also affect sleep. Limit your daily use and do not use these before bedtime. Alcohol may make you sleepy at first, but as its effects wear off, you may awaken a few hours later and have trouble returning to sleep.    Do not exercise, eat or drink large amounts of liquid within 2 hours of your bedtime.    Improve your sleep habits. Have a fixed bed and wake-up time. Try to keep noise, light and heat in your bedroom at a comfortable level. Try using earplugs or eyeshades if  needed.     Avoid watching TV in bed.    If you do not fall asleep within 30 minutes, try to relax by reading or listening to soft music.    Limit daytime napping to one 30 minute period, early in the day.    Get regular exercise. Find other ways to lessen your stress level.    If a medicine was prescribed to help reset your sleep patterns, take it as directed. Sleeping pills are intended for short-term use, only. If taken for too long, the effect wears off while the risk of physical addiction and psychological dependence increases.  Sleep diary  If the cause isn t obvious and it is not improving, try keeping a  sleep diary  for a couple of weeks. Include in it:    The time you go to bed    How long it takes to fall asleep    How many times you wake up    What time you wake up    Your meal times and what you eat    What time you drink alcohol    Your exercise habits and times  Follow-up care  Follow up with your healthcare provider, or as advised. If X-rays or CT scans were done, you will be notified if there is a change in the reading, especially if it affects treatment.  Call 911  Call 911 if any of these occur:    Trouble breathing    Confusion or trouble waking    Fainting or loss of consciousness    Rapid heart rate    New chest, arm, shoulder, neck or upper back pain    Trouble with speech or vision, weakness of an arm or leg    Trouble walking or talking, loss of balance, numbness or weakness in one side of your body, facial droop  When to seek medical advice  Call your healthcare provider right away if any of these occur:    Extreme restlessness or irritability    Confusion or hallucinations (seeing or hearing things that are not there)    Anxiety, depression    Several days without sleeping  Date Last Reviewed: 11/19/2015 2000-2017 [employer]. [address] [place] [address]. All rights reserved. This information is not intended as a substitute for professional medical care.  Always follow your healthcare professional's instructions.                Follow-ups after your visit        Additional Services     ENDOCRINOLOGY ADULT REFERRAL       Your provider has referred you to: MARY: [facility] [phone]   [email or web address]      Please be aware that coverage of these services is subject to the terms and limitations of your health insurance plan.  Call member services at your health plan with any benefit or coverage questions.      Please bring the following to your appointment:    >>   Any x-rays, CTs or MRIs which have been performed.  Contact the facility where they were done to arrange for  prior to your scheduled appointment.    >>   List of current medications   >>   This referral request   >>   Any documents/labs given to you for this referral                  Who to contact     If you have questions or need follow up information about today's clinic visit or your schedule please contact [facility] directly at [phone].  Normal or non-critical lab and imaging results will be communicated to you by MyChart, letter or phone within 4 business days after the clinic has received the results. If you do not hear from us within 7 days, please contact the clinic through [employer] or phone. If you have a critical or abnormal lab result, we will notify you by phone as soon as possible.  Submit refill requests through [employer] or call your pharmacy and they will forward the refill request to us. Please allow 3 business days for your refill to be completed.          Additional Information About Your Visit        [employer] Information     [employer] gives you secure access to your electronic health record. If you see a primary care provider, you can also send messages to your care team and make appointments. If you have questions, please call your primary care clinic.  If you do not have a primary care provider, please call [phone] and  "they will assist you.        Care EveryWhere ID     This is your Care EveryWhere ID. This could be used by other organizations to access your [facility] medical records  [id number]        Your Vitals Were     Pulse Temperature Height Last Period Pulse Oximetry BMI (Body Mass Index)    88 97.9  F (36.6  C) (Oral) 5' 4.5\" (1.638 m) 01/07/2006 98% 32.45 kg/m2       Blood Pressure from Last 3 Encounters:   05/07/18 148/86   08/23/17 120/84   04/10/17 120/78    Weight from Last 3 Encounters:   05/07/18 192 lb (87.1 kg)   08/23/17 189 lb (85.7 kg)   04/10/17 190 lb 1.6 oz (86.2 kg)              We Performed the Following     ENDOCRINOLOGY ADULT REFERRAL        Primary Care Provider Office Phone # Fax #    Anushka Moctezuma [phone] [address] [place] [address]        Equal Access to Services     [facility]: Hadii aad ku hadasho Soomaali, waaxda luqadaha, qaybta kaalmada adeegyada, waxay idiin haycarmenn danielle moss . So [facility] [phone].    ATENCIÓN: Si habla español, tiene a singer disposición servicios gratuitos de asistencia lingüística. [facility] [phone].    We comply with applicable federal civil rights laws and [place] laws. We do not discriminate on the basis of race, color, national origin, age, disability, sex, sexual orientation, or gender identity.            Thank you!     Thank you for choosing [facility]  for your care. Our goal is always to provide you with excellent care. Hearing back from our patients is one way we can continue to improve our services. Please take a few minutes to complete the written survey that you may receive in the mail after your visit with us. Thank you!             Your Updated Medication List - Protect others around you: Learn how to safely use, store and throw away your medicines at www.disposemymeds.org.          This list is accurate as of 5/7/18 12:13 PM.  Always use your most recent med list.                "    Brand Name Dispense Instructions for use Diagnosis    amLODIPine 5 MG tablet    NORVASC    90 tablet    Take 1 tablet (5 mg) by mouth daily    Benign essential hypertension       calcium carbonate 600 MG tablet   Generic drug:  calcium      Take 1 tablet by mouth. Take one tablet daily with Breakfast        citalopram 20 MG tablet    celeXA    90 tablet    Take 1 tablet (20 mg) by mouth daily    Menopausal syndrome (hot flashes)       estradiol 0.1 MG/GM cream    ESTRACE    42.5 g    Place 2 g vaginally three times a week    Vaginal dryness       MULTI-VITAMIN DAILY PO      Take  by mouth.        mupirocin 2 % ointment    BACTROBAN    22 g    Use nightly to chin until healed.    Impetigo       Vitamin D-3 5000 units Tabs      Take 1 tablet by mouth. One tablet daily

## 2018-05-07 NOTE — PROGRESS NOTES
"  SUBJECTIVE:   Damion Stanley is a 57 year old female who presents to clinic today for the following health issues:      Insomnia      Duration: 3 months    Description  Frequency of insomnia:  nightly  Time to fall asleep: 2-3 hours  Middle of night awakening:  nightly  Early morning awakening:      Accompanying signs and symptoms:  none    History  Similar episodes in past:  no   Previous evaluation/sleep study:  no     Precipitating or alleviating factors:  New stressful situation: no   Caffeine intake after lunchtime: no   OTC decongestants: no   Any new medications: no     Therapies tried and outcome: Advil pm -  usually effective    Patient with no sleep problems prior to 3 months ago.  Now with nightly symptoms as above.  Goes to sleep around 1030pm, but sometimes will take 2-3 hours to fall asleep.  Denies anxiety/worry.  She is recently retired and gets 65+min exercise per day (swimming, weights, etc).  Exercise is in the morning.  Two cups of AM coffee - nothing after lunch time.  If she is able to fall asleep wakes up around 230am and hard to fall back asleep - tried getting out of bed if >30min, breathing, meditation.  The only thing she can pinpoint is increase hot flashes during this time.. Hysterectomy about 10 years ago, ovaries remain.  Started feeling \"menopause symptoms\" 5-6 years ago.  Previously treated with testosterone and progesterone with endo for this.        Problem list and histories reviewed & adjusted, as indicated.  Additional history: as documented    Reviewed and updated as needed this visit by clinical staff  Tobacco  Allergies  Meds  Med Hx  Surg Hx  Fam Hx  Soc Hx      Reviewed and updated as needed this visit by Provider         ROS:  Constitutional, HEENT, cardiovascular, pulmonary, gi and gu systems are negative, except as otherwise noted.    OBJECTIVE:     /86  Pulse 88  Temp 97.9  F (36.6  C) (Oral)  Ht 5' 4.5\" (1.638 m)  Wt 192 lb (87.1 kg)  LMP 01/07/2006 "  SpO2 98%  BMI 32.45 kg/m2  Body mass index is 32.45 kg/(m^2).  NA    Diagnostic Test Results:  none     ASSESSMENT/PLAN:       1. Other insomnia  Patient doing all sleep hygeine nicely already - does have Tylenol PM is she really needs it - would not benefit from prescription medications.  Will try over the counter melatonin and have her see endo for concern of hormone issues.    - ENDOCRINOLOGY ADULT REFERRAL    2. Menopausal syndrome (hot flashes)  Patient interested in testosterone and progesterone (not estrogen) - will refer to endo for hormone management.  - ENDOCRINOLOGY ADULT REFERRAL    Spent 25 minutes face to face.   More than 50% of the time was spent in counseling and coordination of care of above issues      Patient Instructions       Referral for endocrine to discuss hormone issues.    In the mean time, try melatonin over the counter nightly.  Insomnia  Insomnia is repeated difficulty going to sleep or staying asleep, or both. Whether you have insomnia is not defined by a specific amount of sleep. Different people need different amounts of sleep, and you may need more or less sleep at different times of your life.  There are 3 major types of insomnia:  short-term, chronic, and  other.   Short-term, or acute insomnia lasts less than 3 months.  The symptoms are temporary and can be linked directly to a stressor, such as the death of a loved one, financial problems, or a new physical problem.  Short-term insomnia stops when the stressor resolves or the person adapts to its presence.  Chronic insomnia occurs at least 3 times a week and lasts longer than 3 months.  Chronic insomnia can occur when either the cause of the sleeping problem is not clear, or the insomnia does not get better when the stressor is resolved. A number of other criteria are also used to make the diagnosis of chronic insomnia.    Other insomnia  is the third type of insomnia-related sleep disorders.  This description applies to  people who have problems getting to sleep or staying asleep, but do not meet all of the factors that describe either short-term or chronic insomnia.    Many things cause insomnia. Different people may have different causes. It can be from an underlying medical or psychological condition, or lifestyle. It can also be primary insomnia, which means no cause can be found.  Causes of insomnia include:    Chronic medical problems- heart disease, gastrointestinal problems, hormonal changes, breathing problems    Anxiety    Stress    Depression    Pain    Work schedule    Sleep apnea    Illegal drugs    Certain medicines  Many different medidcines can affect your sleep, such as stimulants, caffeine, alcohol, some decongestants, and diet pills. Other medicines may include some types of blood pressure pills, steroids, asthma medicines, antihistamines, antidepressants, seizure medicines and statins. Not all of these will affect your sleep, and they shouldn t be stopped without talking to your doctor.  Symptoms of insomnia can include:    Lying awake for long periods at night before falling asleep    Waking up several times during the night    Waking up early in the morning and not being able to get back to sleep    Feeling tired and not refreshed by sleep    Not being able to function properly during the day and finding it hard to concentrate    Irritability    Tiredness and fatigue during the day  Home care    Review your medicines with your doctor or pharmacist to find out if they can cause insomnia. Not all medicines will affect your sleep, but they shouldn't be stopped without reviewing them with your doctor. There may be serious side effects and consequences from suddenly stopping your medicines. Not taking them may cause strokes, heart attacks, and many other problems.    Caffeine, smoking and alcohol also affect sleep. Limit your daily use and do not use these before bedtime. Alcohol may make you sleepy at first, but as  its effects wear off, you may awaken a few hours later and have trouble returning to sleep.    Do not exercise, eat or drink large amounts of liquid within 2 hours of your bedtime.    Improve your sleep habits. Have a fixed bed and wake-up time. Try to keep noise, light and heat in your bedroom at a comfortable level. Try using earplugs or eyeshades if needed.     Avoid watching TV in bed.    If you do not fall asleep within 30 minutes, try to relax by reading or listening to soft music.    Limit daytime napping to one 30 minute period, early in the day.    Get regular exercise. Find other ways to lessen your stress level.    If a medicine was prescribed to help reset your sleep patterns, take it as directed. Sleeping pills are intended for short-term use, only. If taken for too long, the effect wears off while the risk of physical addiction and psychological dependence increases.  Sleep diary  If the cause isn t obvious and it is not improving, try keeping a  sleep diary  for a couple of weeks. Include in it:    The time you go to bed    How long it takes to fall asleep    How many times you wake up    What time you wake up    Your meal times and what you eat    What time you drink alcohol    Your exercise habits and times  Follow-up care  Follow up with your healthcare provider, or as advised. If X-rays or CT scans were done, you will be notified if there is a change in the reading, especially if it affects treatment.  Call 911  Call 911 if any of these occur:    Trouble breathing    Confusion or trouble waking    Fainting or loss of consciousness    Rapid heart rate    New chest, arm, shoulder, neck or upper back pain    Trouble with speech or vision, weakness of an arm or leg    Trouble walking or talking, loss of balance, numbness or weakness in one side of your body, facial droop  When to seek medical advice  Call your healthcare provider right away if any of these occur:    Extreme restlessness or  irritability    Confusion or hallucinations (seeing or hearing things that are not there)    Anxiety, depression    Several days without sleeping  Date Last Reviewed: 11/19/2015 2000-2017 [employer]. [address] [place] [address], [place] [address]. All rights reserved. This information is not intended as a substitute for professional medical care. Always follow your healthcare professional's instructions.            Anushka Moctezuma MD  [facility]

## 2018-05-07 NOTE — PATIENT INSTRUCTIONS
Referral for endocrine to discuss hormone issues.    In the mean time, try melatonin over the counter nightly.  Insomnia  Insomnia is repeated difficulty going to sleep or staying asleep, or both. Whether you have insomnia is not defined by a specific amount of sleep. Different people need different amounts of sleep, and you may need more or less sleep at different times of your life.  There are 3 major types of insomnia:  short-term, chronic, and  other.   Short-term, or acute insomnia lasts less than 3 months.  The symptoms are temporary and can be linked directly to a stressor, such as the death of a loved one, financial problems, or a new physical problem.  Short-term insomnia stops when the stressor resolves or the person adapts to its presence.  Chronic insomnia occurs at least 3 times a week and lasts longer than 3 months.  Chronic insomnia can occur when either the cause of the sleeping problem is not clear, or the insomnia does not get better when the stressor is resolved. A number of other criteria are also used to make the diagnosis of chronic insomnia.    Other insomnia  is the third type of insomnia-related sleep disorders.  This description applies to people who have problems getting to sleep or staying asleep, but do not meet all of the factors that describe either short-term or chronic insomnia.    Many things cause insomnia. Different people may have different causes. It can be from an underlying medical or psychological condition, or lifestyle. It can also be primary insomnia, which means no cause can be found.  Causes of insomnia include:    Chronic medical problems- heart disease, gastrointestinal problems, hormonal changes, breathing problems    Anxiety    Stress    Depression    Pain    Work schedule    Sleep apnea    Illegal drugs    Certain medicines  Many different medidcines can affect your sleep, such as stimulants, caffeine, alcohol, some decongestants, and diet pills. Other medicines  may include some types of blood pressure pills, steroids, asthma medicines, antihistamines, antidepressants, seizure medicines and statins. Not all of these will affect your sleep, and they shouldn t be stopped without talking to your doctor.  Symptoms of insomnia can include:    Lying awake for long periods at night before falling asleep    Waking up several times during the night    Waking up early in the morning and not being able to get back to sleep    Feeling tired and not refreshed by sleep    Not being able to function properly during the day and finding it hard to concentrate    Irritability    Tiredness and fatigue during the day  Home care    Review your medicines with your doctor or pharmacist to find out if they can cause insomnia. Not all medicines will affect your sleep, but they shouldn't be stopped without reviewing them with your doctor. There may be serious side effects and consequences from suddenly stopping your medicines. Not taking them may cause strokes, heart attacks, and many other problems.    Caffeine, smoking and alcohol also affect sleep. Limit your daily use and do not use these before bedtime. Alcohol may make you sleepy at first, but as its effects wear off, you may awaken a few hours later and have trouble returning to sleep.    Do not exercise, eat or drink large amounts of liquid within 2 hours of your bedtime.    Improve your sleep habits. Have a fixed bed and wake-up time. Try to keep noise, light and heat in your bedroom at a comfortable level. Try using earplugs or eyeshades if needed.     Avoid watching TV in bed.    If you do not fall asleep within 30 minutes, try to relax by reading or listening to soft music.    Limit daytime napping to one 30 minute period, early in the day.    Get regular exercise. Find other ways to lessen your stress level.    If a medicine was prescribed to help reset your sleep patterns, take it as directed. Sleeping pills are intended for short-term  use, only. If taken for too long, the effect wears off while the risk of physical addiction and psychological dependence increases.  Sleep diary  If the cause isn t obvious and it is not improving, try keeping a  sleep diary  for a couple of weeks. Include in it:    The time you go to bed    How long it takes to fall asleep    How many times you wake up    What time you wake up    Your meal times and what you eat    What time you drink alcohol    Your exercise habits and times  Follow-up care  Follow up with your healthcare provider, or as advised. If X-rays or CT scans were done, you will be notified if there is a change in the reading, especially if it affects treatment.  Call 911  Call 911 if any of these occur:    Trouble breathing    Confusion or trouble waking    Fainting or loss of consciousness    Rapid heart rate    New chest, arm, shoulder, neck or upper back pain    Trouble with speech or vision, weakness of an arm or leg    Trouble walking or talking, loss of balance, numbness or weakness in one side of your body, facial droop  When to seek medical advice  Call your healthcare provider right away if any of these occur:    Extreme restlessness or irritability    Confusion or hallucinations (seeing or hearing things that are not there)    Anxiety, depression    Several days without sleeping  Date Last Reviewed: 11/19/2015 2000-2017 [employer] [address], [place] [address]. All rights reserved. This information is not intended as a substitute for professional medical care. Always follow your healthcare professional's instructions.

## 2018-05-11 ENCOUNTER — MYC MEDICAL ADVICE (OUTPATIENT)
Dept: PEDIATRICS | Facility: CLINIC | Age: 58
End: 2018-05-11

## 2018-05-11 DIAGNOSIS — I10 BENIGN ESSENTIAL HYPERTENSION: Primary | ICD-10-CM

## 2018-05-11 RX ORDER — LOSARTAN POTASSIUM 25 MG/1
12.5 TABLET ORAL DAILY
Qty: 45 TABLET | Refills: 0 | Status: SHIPPED | OUTPATIENT
Start: 2018-05-11 | End: 2018-08-21

## 2018-05-11 NOTE — TELEPHONE ENCOUNTER
Have her stop the amlodipine and start losartan (sent to her pharmacy).    She needs nurse only for blood pressure check and lab only visit in 1 week after this switch.    Anushka Moctezuma MD  Internal Medicine/Pediatrics  [facility]

## 2018-05-11 NOTE — TELEPHONE ENCOUNTER
Patient advised, removed amlodipine off the med list.  Angelica Goel RN  Message handled by Nurse Triage.

## 2018-05-11 NOTE — TELEPHONE ENCOUNTER
Please review-patient concerned about taking amlodipine due to possible s/e of weight gain.  Patient I trying to lose weight and exercises 350-400 minutes a week.  Wants to stop this medication.    BP Readings from Last 3 Encounters:   05/07/18 138/86   08/23/17 120/84   04/10/17 120/78     Would you consider an alternative?    Angelica Goel RN  Message handled by Nurse Triage.

## 2018-06-12 ENCOUNTER — OFFICE VISIT (OUTPATIENT)
Dept: ENDOCRINOLOGY | Facility: CLINIC | Age: 58
End: 2018-06-12
Payer: COMMERCIAL

## 2018-06-12 VITALS
BODY MASS INDEX: 31.9 KG/M2 | SYSTOLIC BLOOD PRESSURE: 154 MMHG | DIASTOLIC BLOOD PRESSURE: 84 MMHG | HEIGHT: 65 IN | RESPIRATION RATE: 14 BRPM | WEIGHT: 191.5 LBS

## 2018-06-12 DIAGNOSIS — E66.09 CLASS 1 OBESITY DUE TO EXCESS CALORIES WITHOUT SERIOUS COMORBIDITY WITH BODY MASS INDEX (BMI) OF 31.0 TO 31.9 IN ADULT: ICD-10-CM

## 2018-06-12 DIAGNOSIS — E66.811 CLASS 1 OBESITY DUE TO EXCESS CALORIES WITHOUT SERIOUS COMORBIDITY WITH BODY MASS INDEX (BMI) OF 31.0 TO 31.9 IN ADULT: ICD-10-CM

## 2018-06-12 DIAGNOSIS — I10 HYPERTENSION GOAL BP (BLOOD PRESSURE) < 140/90: Primary | ICD-10-CM

## 2018-06-12 LAB — HBA1C MFR BLD: 4.7 % (ref 0–5.6)

## 2018-06-12 PROCEDURE — 84443 ASSAY THYROID STIM HORMONE: CPT | Performed by: INTERNAL MEDICINE

## 2018-06-12 PROCEDURE — 99243 OFF/OP CNSLTJ NEW/EST LOW 30: CPT | Performed by: INTERNAL MEDICINE

## 2018-06-12 PROCEDURE — 36415 COLL VENOUS BLD VENIPUNCTURE: CPT | Performed by: INTERNAL MEDICINE

## 2018-06-12 PROCEDURE — 83036 HEMOGLOBIN GLYCOSYLATED A1C: CPT | Performed by: INTERNAL MEDICINE

## 2018-06-12 NOTE — LETTER
6/12/2018         RE: Damion Stanley  [address]  [place] [address]        Dear Colleague,    [facility] you [facility] Stanley[facility]. Please see a copy of my visit note below.    Name: Damion Stanley  Seen in consultation with Anushka Moctezuma for obesity.  HPI:  Damion Stanley is a 57 year old female who presents for the evaluation of obestiy.  Body mass index is 32.36 kg/(m^2).   Wt Readings from Last 2 Encounters:   06/12/18 86.9 kg (191 lb 8 oz)   05/07/18 87.1 kg (192 lb)       She reports that she was trying to eat healthy--decreasing carbs, increasing protein and increasing vegetables in diet.  She is also exercising 60 minutes of cardio every day but not able to lose weight.    She was also interested in possible testosterone and progesterone replacement which she was on before 3 years back for postmenopausal symptoms.  I discussed that for that purpose--testosterone replacement in females and postmenopausal hormone replacement I would like her to be followed by OB/GYN provider.  She is agreeable to that plan.    Weight gain started: after pregnancy    Hypothyroidism: No.  She is not on thyroid hormone replacement    Use of Steroids:No  Family history of Obesity:Yes: Mother  Diet: currently pt is working on--low-carb, high-protein, whole grains and high intake of vegetables.  Exercise:60 minutes 7 days a week  Menses: Had hysterectomy.  Ovaries were not removed  Diarrhea/Constipation:No  Changes in Hair or Skin:No  Diabetes:No  Sleep Apnea/Snores:No  Hypertension:Yes: On losartan.  Will controlled  Hyperlipidemia:No    PMH/PSH:  Past Medical History:   Diagnosis Date     Plantar fascial fibromatosis      S/P hysterectomy 2006    ovaries remain     Past Surgical History:   Procedure Laterality Date     HC TOOTH EXTRACTION W/FORCEP       HYSTERECTOMY, PAP NO LONGER INDICATED       HYSTERECTOMY, SOLEDAD  3/2006    ovaries intact     Family  "Hx:  Family History   Problem Relation Age of Onset     DIABETES Father      Type 2     Hypertension Father      CANCER Father      mantle cell lymphoma     Breast Cancer Mother      Age 54     C.A.D. Paternal Grandfather      Age 60     CANCER Maternal Grandfather      Lung-smoker     Neurologic Disorder Maternal Grandmother      MS           Social Hx:  Social History     Social History     Marital status:      Spouse name: Sonny     Number of children: 2     Years of education: N/A     Occupational History      [employer]     Social History Main Topics     Smoking status: Never Smoker     Smokeless tobacco: Never Used     Alcohol use 0.0 oz/week     0 Standard drinks or equivalent per week      Comment: glass of wine occasionally     Drug use: No     Sexual activity: Yes     Partners: Male     Birth control/ protection: Surgical      Comment:  vasectomy     Other Topics Concern     Not on file     Social History Narrative          MEDICATIONS:  has a current medication list which includes the following prescription(s): calcium, vitamin d-3, citalopram, estradiol, losartan, multiple vitamin, and mupirocin.    ROS     ROS: 10 point ROS neg other than the symptoms noted above in the HPI.    Physical Exam   VS: /84 (BP Location: Right arm, Patient Position: Chair, Cuff Size: Adult Regular)  Resp 14  Ht 1.638 m (5' 4.5\")  Wt 86.9 kg (191 lb 8 oz)  LMP 01/07/2006  BMI 32.36 kg/m2  GENERAL: AXOX3, NAD, well dressed, answering questions appropriately, appears stated age.  HEENT: No exopthalmous, no proptosis, EOMI, no lig lag, no retraction  NECK: Thyroid normal in size, non tender, no nodules were palpated.  CV: RRR  LUNGS: CTAB  ABDOMEN: +BS, light pigmented stretch marks present.  NEUROLOGY: CN grossly intact, no tremors  PSYCH: normal affect and mood      LABS:  Last Basic Metabolic Panel:  Lab Results   Component Value Date     10/02/2015      Lab Results "   Component Value Date    POTASSIUM 4.2 10/02/2015     Lab Results   Component Value Date    CHLORIDE 105 10/02/2015     Lab Results   Component Value Date    THANH 9.3 10/02/2015     Lab Results   Component Value Date    CO2 27 10/02/2015     Lab Results   Component Value Date    BUN 14 10/02/2015     Lab Results   Component Value Date    CR 0.71 10/02/2015     Lab Results   Component Value Date    GLC 84 08/23/2017       Lab Results   Component Value Date    TSH 1.65 06/09/2015       Lab Results   Component Value Date    A1C 4.7 06/12/2018         All pertinent notes, labs, and images personally reviewed by me.     A/P  Ms.Robin KARLIE Stanley is a 57 year old here for the evaluation of obestiy:    1. Obesity-    Body mass index is 32.36 kg/(m^2).  Obesity is associated with a significant increase in mortality and risk of many disorders, including diabetes mellitus, hypertension, dyslipidemia, heart disease, stroke, sleep apnea, cancer, and many others. Conversely, weight loss is associated with a reduction in obesity-associated morbidity.  Endocrine evaluation of obesity includes Cushing's and thyroid dysfunction.  Will obtain TSH and freeT4 along with 24 hour urine collection.   As well as screen for diabetes.  No history of diabetes, dyslipidemia, sleep apnea.  History of hypertension--well controlled on medication  Labs as below and f/u after that to discuss medical wt loss management.    Plan: TSH with free T4 reflex, Cortisol free urine,         Hemoglobin A1c        Discussed:  I informed the pt that:  1.Weight loss medications can be taken to assist with weight reduction when combined with appropriate healthy lifestyle changes.  2.I discussed possible s/e, risks and benefits of weight loss medications.  3.All medications are FDA approved, however, some may be used ''off label'' for their weight loss benefitIs and some ''short term'' medications can be used for longer periods to achieve desired clinical  outcomes.  4.All patients taking weight loss medications must be seen in clinic for refill authorization.  Risks of obestiy discussed. Encourage healthy diet. Limit snacks, fluid calories, portions. Limit TV/computer time to one hour a day. Encourage physical activity. Recheck in six months or sooner with concerns.    Obesity is a biological preventable and treatable disease, which is associated with significantly increased risk of many acute and chronic health conditions. Obesity has now been recognized as a chronic disease by the American Medical Association.    A range of serious co-morbidities are associated with obesity including increased risk for hypertension, stroke, coronary artery disease, dyslipidemia, Type II diabetes, depression, sleep apnea, cancers of the colon, breast and endometrium, osteoarthritis and female infertility. Therefore, obesity is not just a problem; it s a disease that warrants serious evidence based treatements.    I explained to the patient relevant work up for the diagnosis and management of obesity and discussed treatment options. Body Mass Index (BMI) has been a standard means for calculating risk for overweight and obesity. The new American Association of Clinical Endocrinology (AACE) algorithm recommends lifestyle modifications as the initial phase of treatment for all patients with the BMI equal or greater than 25 kg/m2. Lifestyle modifications includes use of medical nutrition therapy, exercise, tobacco cessation, adequate quality and quantity of sleep, limited consumption of alcohol and reduced stress through implementation of a structured, multidisciplinary program.    In patients with complications associated with obesity, graded interventions are recommended including pharmacological therapy such as phentermine, orlistat, lorcaserin and phentermine/topiramate ER, contrave ( buproprion/naltreone) and the use of very low calorie meal replacement programs.    If medical  intervention is insufficient, surgical therapy may be considered, especially in patients with BMI greater than or equal to 35 kg/m2 with multiple complications. Surgical treatments include lap-band, gastric sleeve or gastric bypass surgery.      More than 50% of the time spent with Ms. Stanley on counseling / coordinating her care.  Total appointment time was 30 minutes.      Follow-up:  After above    Brionna Bassett MD  Endocrinology   [facility]    CC: Anushka Moctezuma    Addendum to above note and clinic visit:    Labs reviewed.    See result note/telephone encounter.            Again, thank you for allowing me to participate in the care of your patient.        Sincerely,        Brionna Bassett MD

## 2018-06-12 NOTE — PATIENT INSTRUCTIONS
[facility] & [place] locations   Dr Bassett, Endocrinology Department      [facility]   [address] [address]  Appointment [phone]  Fax: [phone]   Monday and Tuesday         [facility]   [address] [place] [address]  Appointment [phone]  Fax: [phone]  Wednesday and Thursday           Follow up with OBGYN for consideration of testosterone replacement.  Labs today  24 Hour Urine Collection    - During a 24-hour urine collection, follow your usual diet and drink fluids as you ordinarily would.  - Avoid drinking alcohol before and during the urine collection.    - For a 24-hour urine collection, all of the urine that you pass over a 24-hour time period must be collected. You will receive a special container to collect the sample.    The following are directions for collecting a 24-hour urine sample while at home:    In the morning scheduled to begin the urine collection, urinate in the toilet and flush away the first urine you pass. Write down the date and time. That is the start date and time for the collection.    Collect all urine you pass, day and night, for 24 hours. Use the container given to you to collect the urine. Avoid using other containers. The urine sample must include the last urine that you pass 24 hours after starting the collection. Do not allow toilet paper, stool, or anything else to be added to the urine sample.    Write down the date and time that the last sample is collected.

## 2018-06-12 NOTE — MR AVS SNAPSHOT
After Visit Summary   2018    Damion Stanley    MRN: [id number]           Patient Information     Date Of Birth          1960        Visit Information        Provider Department      2018 2:00 PM Brionna Bassett MD [facility]        Today's Diagnoses     Hypertension goal BP (blood pressure) < 140/90    -  1    Class 1 obesity due to excess calories without serious comorbidity with body mass index (BMI) of 31.0 to 31.9 in adult          Care Instructions    [facility]   Dr Bassett, Endocrinology Department      [facility]   [address] [address]  Appointment [phone]  Fax: [phone]   Monday and Tuesday         [place] [address] [place] [address]  Appointment [phone]  Fax: [phone]  Wednesday and Thursday           Follow up with OBGYN for consideration of testosterone replacement.  Labs today  24 Hour Urine Collection    - During a 24-hour urine collection, follow your usual diet and drink fluids as you ordinarily would.  - Avoid drinking alcohol before and during the urine collection.    - For a 24-hour urine collection, all of the urine that you pass over a 24-hour time period must be collected. You will receive a special container to collect the sample.    The following are directions for collecting a 24-hour urine sample while at home:    In the morning scheduled to begin the urine collection, urinate in the toilet and flush away the first urine you pass. Write down the date and time. That is the start date and time for the collection.    Collect all urine you pass, day and night, for 24 hours. Use the container given to you to collect the urine. Avoid using other containers. The urine sample must include the last urine that you pass 24 hours after starting the collection. Do not allow toilet paper,  "stool, or anything else to be added to the urine sample.    Write down the date and time that the last sample is collected.            Follow-ups after your visit        Future tests that were ordered for you today     Open Future Orders        Priority Expected Expires Ordered    Cortisol free urine Routine  6/13/2019 6/12/2018            Who to contact     If you have questions or need follow up information about today's clinic visit or your schedule please contact [facility] directly at [phone].  Normal or non-critical lab and imaging results will be communicated to you by Mirimushart, letter or phone within 4 business days after the clinic has received the results. If you do not hear from us within 7 days, please contact the clinic through [employer] or phone. If you have a critical or abnormal lab result, we will notify you by phone as soon as possible.  Submit refill requests through [employer] or call your pharmacy and they will forward the refill request to us. Please allow 3 business days for your refill to be completed.          Additional Information About Your Visit        [employer] Information     [employer] gives you secure access to your electronic health record. If you see a primary care provider, you can also send messages to your care team and make appointments. If you have questions, please call your primary care clinic.  If you do not have a primary care provider, please call [phone] and they will assist you.        Care EveryWhere ID     This is your Care EveryWhere ID. This could be used by other organizations to access your Mobile medical records  [id number]        Your Vitals Were     Respirations Height Last Period BMI (Body Mass Index)          14 1.638 m (5' 4.5\") 01/07/2006 32.36 kg/m2         Blood Pressure from Last 3 Encounters:   06/12/18 154/84   05/07/18 138/86   08/23/17 120/84    Weight from Last 3 Encounters:   06/12/18 86.9 kg (191 lb 8 oz)   05/07/18 87.1 kg (192 lb) "   08/23/17 85.7 kg (189 lb)              We Performed the Following     Hemoglobin A1c     TSH with free T4 reflex     WEIGHT MANAGEMENT INFO        Primary Care Provider Office Phone # Fax #    Anushka Moctezuma [phone] [address] [place] [address]        Equal Access to Services     [facility]: Hadii aad ku hadasho Soomaali, waaxda luqadaha, qaybta kaalmada adeegyada, waxay zulayin monican ademalgorzata guido lagaryabril panchal. So [facility] [phone].    ATENCIÓN: Si habla español, tiene a singer disposición servicios gratuitos de asistencia lingüística. [facility] [phone].    We comply with applicable federal civil rights laws and [place] laws. We do not discriminate on the basis of race, color, national origin, age, disability, sex, sexual orientation, or gender identity.            Thank you!     Thank you for choosing [facility]  for your care. Our goal is always to provide you with excellent care. Hearing back from our patients is one way we can continue to improve our services. Please take a few minutes to complete the written survey that you may receive in the mail after your visit with us. Thank you!             Your Updated Medication List - Protect others around you: Learn how to safely use, store and throw away your medicines at www.disposemymeds.org.          This list is accurate as of 6/12/18  2:28 PM.  Always use your most recent med list.                   Brand Name Dispense Instructions for use Diagnosis    calcium carbonate 600 MG tablet   Generic drug:  calcium      Take 1 tablet by mouth. Take one tablet daily with Breakfast        citalopram 20 MG tablet    celeXA    90 tablet    Take 1 tablet (20 mg) by mouth daily    Menopausal syndrome (hot flashes)       estradiol 0.1 MG/GM cream    ESTRACE    42.5 g    Place 2 g vaginally three times a week    Vaginal dryness       losartan 25 MG tablet    COZAAR    45 tablet    Take 0.5 tablets (12.5 mg) by mouth  daily    Benign essential hypertension       MULTI-VITAMIN DAILY PO      Take  by mouth.        mupirocin 2 % ointment    BACTROBAN    22 g    Use nightly to chin until healed.    Impetigo       Vitamin D-3 5000 units Tabs      Take 1 tablet by mouth. One tablet daily

## 2018-06-12 NOTE — PROGRESS NOTES
Name: Damion Stanley  Seen in consultation with Anushka Moctezuma for obesity.  HPI:  Damion Stanley is a 57 year old female who presents for the evaluation of obestiy.  Body mass index is 32.36 kg/(m^2).   Wt Readings from Last 2 Encounters:   06/12/18 86.9 kg (191 lb 8 oz)   05/07/18 87.1 kg (192 lb)       She reports that she was trying to eat healthy--decreasing carbs, increasing protein and increasing vegetables in diet.  She is also exercising 60 minutes of cardio every day but not able to lose weight.    She was also interested in possible testosterone and progesterone replacement which she was on before 3 years back for postmenopausal symptoms.  I discussed that for that purpose--testosterone replacement in females and postmenopausal hormone replacement I would like her to be followed by OB/GYN provider.  She is agreeable to that plan.    Weight gain started: after pregnancy    Hypothyroidism: No.  She is not on thyroid hormone replacement    Use of Steroids:No  Family history of Obesity:Yes: Mother  Diet: currently pt is working on--low-carb, high-protein, whole grains and high intake of vegetables.  Exercise:60 minutes 7 days a week  Menses: Had hysterectomy.  Ovaries were not removed  Diarrhea/Constipation:No  Changes in Hair or Skin:No  Diabetes:No  Sleep Apnea/Snores:No  Hypertension:Yes: On losartan.  Will controlled  Hyperlipidemia:No    PMH/PSH:  Past Medical History:   Diagnosis Date     Plantar fascial fibromatosis      S/P hysterectomy 2006    ovaries remain     Past Surgical History:   Procedure Laterality Date     HC TOOTH EXTRACTION W/FORCEP       HYSTERECTOMY, PAP NO LONGER INDICATED       HYSTERECTOMY, SOLEDAD  3/2006    ovaries intact     Family Hx:  Family History   Problem Relation Age of Onset     DIABETES Father      Type 2     Hypertension Father      CANCER Father      mantle cell lymphoma     Breast Cancer Mother      Age 54     C.A.D. Paternal Grandfather      Age 60     CANCER  "Maternal Grandfather      Lung-smoker     Neurologic Disorder Maternal Grandmother      MS           Social Hx:  Social History     Social History     Marital status:      Spouse name: Sonny     Number of children: 2     Years of education: N/A     Occupational History      [employer]     Social History Main Topics     Smoking status: Never Smoker     Smokeless tobacco: Never Used     Alcohol use 0.0 oz/week     0 Standard drinks or equivalent per week      Comment: glass of wine occasionally     Drug use: No     Sexual activity: Yes     Partners: Male     Birth control/ protection: Surgical      Comment:  vasectomy     Other Topics Concern     Not on file     Social History Narrative          MEDICATIONS:  has a current medication list which includes the following prescription(s): calcium, vitamin d-3, citalopram, estradiol, losartan, multiple vitamin, and mupirocin.    ROS     ROS: 10 point ROS neg other than the symptoms noted above in the HPI.    Physical Exam   VS: /84 (BP Location: Right arm, Patient Position: Chair, Cuff Size: Adult Regular)  Resp 14  Ht 1.638 m (5' 4.5\")  Wt 86.9 kg (191 lb 8 oz)  LMP 01/07/2006  BMI 32.36 kg/m2  GENERAL: AXOX3, NAD, well dressed, answering questions appropriately, appears stated age.  HEENT: No exopthalmous, no proptosis, EOMI, no lig lag, no retraction  NECK: Thyroid normal in size, non tender, no nodules were palpated.  CV: RRR  LUNGS: CTAB  ABDOMEN: +BS, light pigmented stretch marks present.  NEUROLOGY: CN grossly intact, no tremors  PSYCH: normal affect and mood      LABS:  Last Basic Metabolic Panel:  Lab Results   Component Value Date     10/02/2015      Lab Results   Component Value Date    POTASSIUM 4.2 10/02/2015     Lab Results   Component Value Date    CHLORIDE 105 10/02/2015     Lab Results   Component Value Date    THANH 9.3 10/02/2015     Lab Results   Component Value Date    CO2 27 10/02/2015     Lab " Results   Component Value Date    BUN 14 10/02/2015     Lab Results   Component Value Date    CR 0.71 10/02/2015     Lab Results   Component Value Date    GLC 84 08/23/2017       Lab Results   Component Value Date    TSH 1.65 06/09/2015       Lab Results   Component Value Date    A1C 4.7 06/12/2018         All pertinent notes, labs, and images personally reviewed by me.     A/P  Ms.Robin KARLIE Stanley is a 57 year old here for the evaluation of obestiy:    1. Obesity-    Body mass index is 32.36 kg/(m^2).  Obesity is associated with a significant increase in mortality and risk of many disorders, including diabetes mellitus, hypertension, dyslipidemia, heart disease, stroke, sleep apnea, cancer, and many others. Conversely, weight loss is associated with a reduction in obesity-associated morbidity.  Endocrine evaluation of obesity includes Cushing's and thyroid dysfunction.  Will obtain TSH and freeT4 along with 24 hour urine collection.   As well as screen for diabetes.  No history of diabetes, dyslipidemia, sleep apnea.  History of hypertension--well controlled on medication  Labs as below and f/u after that to discuss medical wt loss management.    Plan: TSH with free T4 reflex, Cortisol free urine,         Hemoglobin A1c        Discussed:  I informed the pt that:  1.Weight loss medications can be taken to assist with weight reduction when combined with appropriate healthy lifestyle changes.  2.I discussed possible s/e, risks and benefits of weight loss medications.  3.All medications are FDA approved, however, some may be used ''off label'' for their weight loss benefitIs and some ''short term'' medications can be used for longer periods to achieve desired clinical outcomes.  4.All patients taking weight loss medications must be seen in clinic for refill authorization.  Risks of obestiy discussed. Encourage healthy diet. Limit snacks, fluid calories, portions. Limit TV/computer time to one hour a day. Encourage  physical activity. Recheck in six months or sooner with concerns.    Obesity is a biological preventable and treatable disease, which is associated with significantly increased risk of many acute and chronic health conditions. Obesity has now been recognized as a chronic disease by the American Medical Association.    A range of serious co-morbidities are associated with obesity including increased risk for hypertension, stroke, coronary artery disease, dyslipidemia, Type II diabetes, depression, sleep apnea, cancers of the colon, breast and endometrium, osteoarthritis and female infertility. Therefore, obesity is not just a problem; it s a disease that warrants serious evidence based treatements.    I explained to the patient relevant work up for the diagnosis and management of obesity and discussed treatment options. Body Mass Index (BMI) has been a standard means for calculating risk for overweight and obesity. The new American Association of Clinical Endocrinology (AACE) algorithm recommends lifestyle modifications as the initial phase of treatment for all patients with the BMI equal or greater than 25 kg/m2. Lifestyle modifications includes use of medical nutrition therapy, exercise, tobacco cessation, adequate quality and quantity of sleep, limited consumption of alcohol and reduced stress through implementation of a structured, multidisciplinary program.    In patients with complications associated with obesity, graded interventions are recommended including pharmacological therapy such as phentermine, orlistat, lorcaserin and phentermine/topiramate ER, contrave ( buproprion/naltreone) and the use of very low calorie meal replacement programs.    If medical intervention is insufficient, surgical therapy may be considered, especially in patients with BMI greater than or equal to 35 kg/m2 with multiple complications. Surgical treatments include lap-band, gastric sleeve or gastric bypass surgery.      More than  50% of the time spent with Ms. Stanley on counseling / coordinating her care.  Total appointment time was 30 minutes.      Follow-up:  After above    Brionna Bassett MD  Endocrinology   [facility]    CC: Anushka Moctezuma    Addendum to above note and clinic visit:    Labs reviewed.    See result note/telephone encounter.

## 2018-06-13 LAB — TSH SERPL DL<=0.005 MIU/L-ACNC: 1.5 MU/L (ref 0.4–4)

## 2018-06-21 DIAGNOSIS — E66.811 CLASS 1 OBESITY DUE TO EXCESS CALORIES WITHOUT SERIOUS COMORBIDITY WITH BODY MASS INDEX (BMI) OF 31.0 TO 31.9 IN ADULT: ICD-10-CM

## 2018-06-21 DIAGNOSIS — E66.09 CLASS 1 OBESITY DUE TO EXCESS CALORIES WITHOUT SERIOUS COMORBIDITY WITH BODY MASS INDEX (BMI) OF 31.0 TO 31.9 IN ADULT: ICD-10-CM

## 2018-06-21 PROCEDURE — 99000 SPECIMEN HANDLING OFFICE-LAB: CPT | Performed by: INTERNAL MEDICINE

## 2018-06-21 PROCEDURE — 82530 CORTISOL FREE: CPT | Mod: 90 | Performed by: INTERNAL MEDICINE

## 2018-06-24 LAB
COLLECT DURATION TIME SPEC: 24 H
CORTIS F 24H UR HPLC-MCNC: 9.15 UG/L
CORTIS F 24H UR-MRATE: 26.1 UG/D
CORTIS F/CREAT 24H UR: 22.32 UG/G CRT
CREAT 24H UR-MCNC: 41 MG/DL
CREAT 24H UR-MRATE: 1168 MG/D (ref 500–1400)
IMP & REVIEW OF LAB RESULTS: NORMAL
SPECIMEN VOL ?TM UR: 2850 ML

## 2018-08-21 DIAGNOSIS — I10 BENIGN ESSENTIAL HYPERTENSION: ICD-10-CM

## 2018-08-21 NOTE — TELEPHONE ENCOUNTER
"Requested Prescriptions   Pending Prescriptions Disp Refills     losartan (COZAAR) 25 MG tablet [Pharmacy Med Name: LOSARTAN 25MG TABLETS] 45 tablet 0     Sig: TAKE 1/2 TABLET(12.5 MG) BY MOUTH DAILY    Angiotensin-II Receptors Failed    8/21/2018  3:14 AM       Failed - Blood pressure under 140/90 in past 12 months    BP Readings from Last 3 Encounters:   06/12/18 154/84   05/07/18 138/86   08/23/17 120/84                Failed - Normal serum creatinine on file in past 12 months    Recent Labs   Lab Test  10/02/15   1513   CR  0.71            Failed - Normal serum potassium on file in past 12 months    Recent Labs   Lab Test  10/02/15   1513   POTASSIUM  4.2                   Passed - Recent (12 mo) or future (30 days) visit within the authorizing provider's specialty    Patient had office visit in the last 12 months or has a visit in the next 30 days with authorizing provider or within the authorizing provider's specialty.  See \"Patient Info\" tab in inbasket, or \"Choose Columns\" in Meds & Orders section of the refill encounter.           Passed - Patient is age 18 or older       Passed - No active pregnancy on record       Passed - No positive pregnancy test in past 12 months        Last Written Prescription Date:  05/11/2018  Last Fill Quantity: 45,  # refills: 0   Last office visit: 5/7/2018 with prescribing provider:  Dr. Moctezuma  Future Office Visit:      "

## 2018-08-22 RX ORDER — LOSARTAN POTASSIUM 25 MG/1
TABLET ORAL
Qty: 15 TABLET | Refills: 0 | Status: SHIPPED | OUTPATIENT
Start: 2018-08-22 | End: 2018-10-05

## 2018-08-22 NOTE — TELEPHONE ENCOUNTER
Routing refill request to provider for review/approval because:  Labs not current:  CR    BP not at goal    Mamie GOLDSMITH RN, BSN, PHN  [place] [facility] RN

## 2018-08-23 NOTE — TELEPHONE ENCOUNTER
Called and left message to return call and schedule appointment due. Patient is due for hypertension follow up and labs.    Radha Carrera MA 9:24 AM 8/23/2018

## 2018-08-30 NOTE — TELEPHONE ENCOUNTER
My chart message sent to schedule hypertension and lab follow up with Dr. Moctezuma.    Saba Ahumada MA

## 2018-09-13 ENCOUNTER — OFFICE VISIT (OUTPATIENT)
Dept: URGENT CARE | Facility: URGENT CARE | Age: 58
End: 2018-09-13
Payer: COMMERCIAL

## 2018-09-13 VITALS
TEMPERATURE: 99.2 F | OXYGEN SATURATION: 96 % | BODY MASS INDEX: 32.11 KG/M2 | HEART RATE: 74 BPM | SYSTOLIC BLOOD PRESSURE: 130 MMHG | DIASTOLIC BLOOD PRESSURE: 60 MMHG | WEIGHT: 190 LBS

## 2018-09-13 DIAGNOSIS — R30.0 DYSURIA: ICD-10-CM

## 2018-09-13 DIAGNOSIS — N30.01 ACUTE CYSTITIS WITH HEMATURIA: Primary | ICD-10-CM

## 2018-09-13 LAB
ALBUMIN UR-MCNC: 30 MG/DL
APPEARANCE UR: CLEAR
BACTERIA #/AREA URNS HPF: ABNORMAL /HPF
BILIRUB UR QL STRIP: NEGATIVE
COLOR UR AUTO: YELLOW
GLUCOSE UR STRIP-MCNC: NEGATIVE MG/DL
HGB UR QL STRIP: ABNORMAL
KETONES UR STRIP-MCNC: NEGATIVE MG/DL
LEUKOCYTE ESTERASE UR QL STRIP: ABNORMAL
NITRATE UR QL: NEGATIVE
NON-SQ EPI CELLS #/AREA URNS LPF: ABNORMAL /LPF
PH UR STRIP: 5.5 PH (ref 5–7)
RBC #/AREA URNS AUTO: ABNORMAL /HPF
SOURCE: ABNORMAL
SP GR UR STRIP: <=1.005 (ref 1–1.03)
UROBILINOGEN UR STRIP-ACNC: 0.2 EU/DL (ref 0.2–1)
WBC #/AREA URNS AUTO: ABNORMAL /HPF

## 2018-09-13 PROCEDURE — 87088 URINE BACTERIA CULTURE: CPT | Performed by: PHYSICIAN ASSISTANT

## 2018-09-13 PROCEDURE — 99213 OFFICE O/P EST LOW 20 MIN: CPT | Performed by: PHYSICIAN ASSISTANT

## 2018-09-13 PROCEDURE — 87086 URINE CULTURE/COLONY COUNT: CPT | Performed by: PHYSICIAN ASSISTANT

## 2018-09-13 PROCEDURE — 87186 SC STD MICRODIL/AGAR DIL: CPT | Performed by: PHYSICIAN ASSISTANT

## 2018-09-13 PROCEDURE — 81001 URINALYSIS AUTO W/SCOPE: CPT | Performed by: PHYSICIAN ASSISTANT

## 2018-09-13 RX ORDER — NITROFURANTOIN 25; 75 MG/1; MG/1
100 CAPSULE ORAL 2 TIMES DAILY
Qty: 14 CAPSULE | Refills: 0 | Status: SHIPPED | OUTPATIENT
Start: 2018-09-13 | End: 2019-03-28

## 2018-09-13 NOTE — MR AVS SNAPSHOT
"              After Visit Summary   9/13/2018    Damion Stanley    MRN: [id number]           Patient Information     Date Of Birth          1960        Visit Information        Provider Department      9/13/2018 7:30 PM James Mayers PA-C [facility]        Today's Diagnoses     Acute cystitis with hematuria    -  1    Dysuria          Care Instructions       * BLADDER INFECTION,Female (Adult)    A bladder infection (\"cystitis\" or \"UTI\") usually causes a constant urge to urinate and a burning when passing urine. Urine may be cloudy, smelly or dark. There may be pain in the lower abdomen. A bladder infection occurs when bacteria from the vaginal area enter the bladder opening (urethra). This can occur from sexual intercourse, wearing tight clothing, dehydration and other factors.  HOME CARE:  1. Drink lots of fluids (at least 6-8 glasses a day, unless you must restrict fluids for other medical reasons). This will force the medicine into your urinary system and flush the bacteria out of your body. Cranberry juice has been shown to help clear out the bacteria.  2. Avoid sexual intercourse until your symptoms are gone.  3. A bladder infection is treated with antibiotics. You may also be given Pyridium (generic = phenazopyridine) to reduce the burning sensation. This medicine will cause your urine to become a bright orange color. The orange urine may stain clothing. You may wear a pad or panty-liner to protect clothing.  PREVENTING FUTURE INFECTIONS:  1. Always wipe from front to back after a bowel movement.  2. Keep the genital area clean and dry.  3. Drink plenty of fluids each day to avoid dehydration.  4. Urinate right after intercourse to flush out the bladder.  5. Wear cotton underwear and cotton-lined panty hose; avoid tight-fitting pants.  6. If you are on birth control pills and are having frequent bladder infections, discuss with your doctor.  FOLLOW UP: Return to this " facility or see your doctor if ALL symptoms are not gone after three days of treatment.  GET PROMPT MEDICAL ATTENTION if any of the following occur:    Fever over 101 F (38.3 C)    No improvement by the third day of treatment    Increasing back or abdominal pain    Repeated vomiting; unable to keep medicine down    Weakness, dizziness or fainting    Vaginal discharge    Pain, redness or swelling in the labia (outer vaginal area)    0900-8085 [employer]. [address] [place] [address], [place] [address]. All rights reserved. This information is not intended as a substitute for professional medical care. Always follow your healthcare professional's instructions.  This information has been modified by your health care provider with permission from the publisher.            Follow-ups after your visit        Your next 10 appointments already scheduled     Sep 19, 2018 10:00 AM CDT   PHYS[facility] with [facility]ushka [facility]uma [facility]   [facility]    [address] [place] [address]  [address]  [facility] [id number] [phone]              Who to contact     If you have questions or need follow up information about today's clinic visit or your schedule please contact [facility] directly at [phone].  Normal or non-critical lab and imaging results will be communicated to you by MyChart, letter or phone within 4 business days after the clinic has received the results. If you do not hear from us within 7 days, please contact the clinic through [employer] or phone. If you have a critical or abnormal lab result, we will notify you by phone as soon as possible.  Submit refill requests through [employer] or call your pharmacy and they will forward the refill request to us. Please allow 3 business days for your refill to be completed.          Additional Information About Your Visit        MyChart Information     [employer] gives you secure access to your electronic health record. If you  see a primary care provider, you can also send messages to your care team and make appointments. If you have questions, please call your primary care clinic.  If you do not have a primary care provider, please call [phone] and they will assist you.        Care EveryWhere ID     This is your Care EveryWhere ID. This could be used by other organizations to access your [facility] medical records  [id number]        Your Vitals Were     Pulse Temperature Last Period Pulse Oximetry BMI (Body Mass Index)       74 99.2  F (37.3  C) (Tympanic) 01/07/2006 96% 32.11 kg/m2        Blood Pressure from Last 3 Encounters:   09/13/18 130/60   06/12/18 154/84   05/07/18 138/86    Weight from Last 3 Encounters:   09/13/18 190 lb (86.2 kg)   06/12/18 191 lb 8 oz (86.9 kg)   05/07/18 192 lb (87.1 kg)              We Performed the Following     *UA reflex to Microscopic and Culture ([place] and [facility] (except [place] and [place])     Urine Culture Aerobic Bacterial          Today's Medication Changes          These changes are accurate as of 9/13/18  8:41 PM.  If you have any questions, ask your nurse or doctor.               Start taking these medicines.        Dose/Directions    nitroFURantoin (macrocrystal-monohydrate) 100 MG capsule   Commonly known as:  MACROBID   Used for:  Acute cystitis with hematuria   Started by:  James Mayers PA-C        Dose:  100 mg   Take 1 capsule (100 mg) by mouth 2 times daily   Quantity:  14 capsule   Refills:  0            Where to get your medicines      [facility] [address] [place] [address] & [address] [address]     Phone:  [phone]     nitroFURantoin (macrocrystal-monohydrate) 100 MG capsule                Primary Care Provider Office Phone # Fax #    Anushka Moctezuma [phone] [address] [place]  [id number]        Equal Access to Services     [facility]: Hadii chris downs hayes Minor, wanatida luqadaha, qaybta sebastianalecchristi norris, patrice panchal. So [facility] [phone].    ATENCIÓN: Si habla español, tiene a singer disposición servicios gratuitos de asistencia lingüística. Llame al [phone].    We comply with applicable federal civil rights laws and [place] laws. We do not discriminate on the basis of race, color, national origin, age, disability, sex, sexual orientation, or gender identity.            Thank you!     Thank you for choosing [facility]  for your care. Our goal is always to provide you with excellent care. Hearing back from our patients is one way we can continue to improve our services. Please take a few minutes to complete the written survey that you may receive in the mail after your visit with us. Thank you!             Your Updated Medication List - Protect others around you: Learn how to safely use, store and throw away your medicines at www.disposemymeds.org.          This list is accurate as of 9/13/18  8:41 PM.  Always use your most recent med list.                   Brand Name Dispense Instructions for use Diagnosis    calcium carbonate 600 MG tablet   Generic drug:  calcium      Take 1 tablet by mouth. Take one tablet daily with Breakfast        citalopram 20 MG tablet    celeXA    90 tablet    Take 1 tablet (20 mg) by mouth daily    Menopausal syndrome (hot flashes)       estradiol 0.1 MG/GM cream    ESTRACE    42.5 g    Place 2 g vaginally three times a week    Vaginal dryness       losartan 25 MG tablet    COZAAR    15 tablet    TAKE 1/2 TABLET(12.5 MG) BY MOUTH DAILY    Benign essential hypertension       MULTI-VITAMIN DAILY PO      Take  by mouth.        mupirocin 2 % ointment    BACTROBAN    22 g    Use nightly to chin until healed.    Impetigo       nitroFURantoin (macrocrystal-monohydrate) 100 MG capsule    MACROBID    14 capsule    Take  1 capsule (100 mg) by mouth 2 times daily    Acute cystitis with hematuria       Vitamin D-3 5000 units Tabs      Take 1 tablet by mouth. One tablet daily

## 2018-09-14 NOTE — PROGRESS NOTES
Damion Meredith to clinic today c/o acute onset--1 day--hx of dysuria, urinary urgency/frequency and suprapubic area pressure.  No hx of resistant infection, kidney stones, or kidney infections.      Red Flag Review: Denies any abdominal pain or flank pain. Denies any F/C/N/V/D or other acute sxs.      Past Medical History:   Diagnosis Date     Plantar fascial fibromatosis      S/P hysterectomy 2006    ovaries remain       Current Outpatient Prescriptions   Medication     calcium (CALCIUM 600) 600 MG tablet     Cholecalciferol (VITAMIN D-3) 5000 UNITS TABS     citalopram (CELEXA) 20 MG tablet     estradiol (ESTRACE) 0.1 MG/GM cream     losartan (COZAAR) 25 MG tablet     Multiple Vitamin (MULTI-VITAMIN DAILY PO)     mupirocin (BACTROBAN) 2 % ointment     No current facility-administered medications for this visit.        Allergies   Allergen Reactions     Hydrochlorothiazide      Sweating, insomnia     Lisinopril Cough     Penicillins      rash     Tylenol W/Codeine [Acetaminophen-Codeine]      Stomach Upset         ROS:     GI: Denies any abdominal pain. Denies any F/C/N/V/D.   GYN [place] any vaginal discharge or other GYN sxs.   SKIN: Denies rash   NEURO: Denies any high fevers, confusion or mental status changes      OBJECTIVE:  /60 (BP Location: Right arm)  Pulse 74  Temp 99.2  F (37.3  C) (Tympanic)  Wt 190 lb (86.2 kg)  LMP 01/07/2006  SpO2 96%  BMI 32.11 kg/m2        GENERAL:  Very pleasant, comfortable and generally well appearing.  SKIN: No rashes.  Normal color.  Sclera clear.  CARDIAC:NORMAL - regular rate and rhythm without murmur., normal s1/s2 and without extra heart sounds  RESP: Normal - CTA without rales, rhonchi, or wheezing.  ABDOMEN:  Soft, non-tender, non-distended.  Positive normal bowel sounds.  No HSM or masses.  Positive, mild, suprapubic tenderness.  No CVA tenderness.  NEURO: Alert and oriented.  Normal speech and mentation.  CN II/XII grossly intact.  Gait within  "normal limits.      Component      Latest Ref Rng & Units 9/13/2018   Color Urine       Yellow   Appearance Urine       Clear   Glucose Urine      NEG:Negative mg/dL Negative   Bilirubin Urine      NEG:Negative Negative   Ketones Urine      NEG:Negative mg/dL Negative   Specific Gravity Urine      1.003 - 1.035 <=1.005   Blood Urine      NEG:Negative Moderate (A)   pH Urine      5.0 - 7.0 pH 5.5   Protein Albumin Urine      NEG:Negative mg/dL 30 (A)   Urobilinogen Urine      0.2 - 1.0 EU/dL 0.2   Nitrite Urine      NEG:Negative Negative   Leukocyte Esterase Urine      NEG:Negative Small (A)   Source       Midstream Urine   WBC Urine      OTO5:0 - 5 /HPF 10-25 (A)   RBC Urine      OTO2:O - 2 /HPF 10-25 (A)   Squamous Epithelial /LPF Urine      FEW:Few /LPF Few   Bacteria Urine      NEG:Negative /HPF Few (A)     ASSESSMENT/PLAN:    (N30.01) Acute cystitis with hematuria  (primary encounter diagnosis)  Plan: nitroFURantoin, macrocrystal-monohydrate,         (MACROBID) 100 MG capsule, Urine Microscopic      1. Abx as per above.     2. Push plain, non-carbonated water.  Avoid acidic fluids and bladder irritants (reviewed with patient). Take full course of antibiotic as prescribed.     3. Follow-up immediately if any fever, chills, nausea, vomiting, back or flank pain.  Follow-up with PCP if there are any residual urinary symptoms after course of prescribed antibiotics.        4. Follow-up with PCP if sxs change, worsen or fail to fully resolve with above tx.       5. In addition to the above, pyelonephritis and UTI \"red flag\" signs and sxs are reviewed with pt both verbally and by way of printed educational material for home review.  Pt verbalizes understanding of and agrees to the above plan.     (R30.0) Dysuria  Plan: *UA reflex to Microscopic and Culture ([place]         and [facility] (except [place] and         [place]), Urine Culture Aerobic Bacterial  As per above         "

## 2018-09-14 NOTE — PATIENT INSTRUCTIONS
"   * BLADDER INFECTION,Female (Adult)    A bladder infection (\"cystitis\" or \"UTI\") usually causes a constant urge to urinate and a burning when passing urine. Urine may be cloudy, smelly or dark. There may be pain in the lower abdomen. A bladder infection occurs when bacteria from the vaginal area enter the bladder opening (urethra). This can occur from sexual intercourse, wearing tight clothing, dehydration and other factors.  HOME CARE:  1. Drink lots of fluids (at least 6-8 glasses a day, unless you must restrict fluids for other medical reasons). This will force the medicine into your urinary system and flush the bacteria out of your body. Cranberry juice has been shown to help clear out the bacteria.  2. Avoid sexual intercourse until your symptoms are gone.  3. A bladder infection is treated with antibiotics. You may also be given Pyridium (generic = phenazopyridine) to reduce the burning sensation. This medicine will cause your urine to become a bright orange color. The orange urine may stain clothing. You may wear a pad or panty-liner to protect clothing.  PREVENTING FUTURE INFECTIONS:  1. Always wipe from front to back after a bowel movement.  2. Keep the genital area clean and dry.  3. Drink plenty of fluids each day to avoid dehydration.  4. Urinate right after intercourse to flush out the bladder.  5. Wear cotton underwear and cotton-lined panty hose; avoid tight-fitting pants.  6. If you are on birth control pills and are having frequent bladder infections, discuss with your doctor.  FOLLOW UP: Return to this facility or see your doctor if ALL symptoms are not gone after three days of treatment.  GET PROMPT MEDICAL ATTENTION if any of the following occur:    Fever over 101 F (38.3 C)    No improvement by the third day of treatment    Increasing back or abdominal pain    Repeated vomiting; unable to keep medicine down    Weakness, dizziness or fainting    Vaginal discharge    Pain, redness or swelling in " the labia (outer vaginal area)    3416-1861 [employer], [employer]. [address] [place] [address], [place] [address]. All rights reserved. This information is not intended as a substitute for professional medical care. Always follow your healthcare professional's instructions.  This information has been modified by your health care provider with permission from the publisher.

## 2018-09-15 LAB
BACTERIA SPEC CULT: ABNORMAL
SPECIMEN SOURCE: ABNORMAL

## 2018-09-16 ASSESSMENT — ENCOUNTER SYMPTOMS
HEARTBURN: 0
JOINT SWELLING: 0
FEVER: 0
PALPITATIONS: 0
DIZZINESS: 0
ABDOMINAL PAIN: 0
CONSTIPATION: 0
CHILLS: 0
SHORTNESS OF BREATH: 0
WEAKNESS: 0
NAUSEA: 0
BREAST MASS: 0
PARESTHESIAS: 0
COUGH: 0
DYSURIA: 0
HEMATOCHEZIA: 0
MYALGIAS: 0
HEMATURIA: 0
ARTHRALGIAS: 0
NERVOUS/ANXIOUS: 0
DIARRHEA: 0
SORE THROAT: 0
EYE PAIN: 0
HEADACHES: 0
FREQUENCY: 0

## 2018-09-19 ENCOUNTER — OFFICE VISIT (OUTPATIENT)
Dept: PEDIATRICS | Facility: CLINIC | Age: 58
End: 2018-09-19
Payer: COMMERCIAL

## 2018-09-19 DIAGNOSIS — Z12.31 VISIT FOR SCREENING MAMMOGRAM: ICD-10-CM

## 2018-09-19 DIAGNOSIS — Z11.4 SCREENING FOR HIV (HUMAN IMMUNODEFICIENCY VIRUS): ICD-10-CM

## 2018-09-19 DIAGNOSIS — Z13.1 SCREENING FOR DIABETES MELLITUS: ICD-10-CM

## 2018-09-19 DIAGNOSIS — Z13.6 SCREENING FOR CARDIOVASCULAR CONDITION: ICD-10-CM

## 2018-09-19 DIAGNOSIS — Z23 NEED FOR PROPHYLACTIC VACCINATION AND INOCULATION AGAINST INFLUENZA: ICD-10-CM

## 2018-09-19 DIAGNOSIS — I10 BENIGN ESSENTIAL HYPERTENSION: ICD-10-CM

## 2018-09-19 DIAGNOSIS — Z00.00 ROUTINE GENERAL MEDICAL EXAMINATION AT A HEALTH CARE FACILITY: Primary | ICD-10-CM

## 2018-09-19 LAB
ANION GAP SERPL CALCULATED.3IONS-SCNC: 10 MMOL/L (ref 3–14)
BUN SERPL-MCNC: 14 MG/DL (ref 7–30)
CALCIUM SERPL-MCNC: 9 MG/DL (ref 8.5–10.1)
CHLORIDE SERPL-SCNC: 108 MMOL/L (ref 94–109)
CHOLEST SERPL-MCNC: 181 MG/DL
CO2 SERPL-SCNC: 24 MMOL/L (ref 20–32)
CREAT SERPL-MCNC: 0.64 MG/DL (ref 0.52–1.04)
GFR SERPL CREATININE-BSD FRML MDRD: >90 ML/MIN/1.7M2
GLUCOSE SERPL-MCNC: 80 MG/DL (ref 70–99)
HDLC SERPL-MCNC: 89 MG/DL
LDLC SERPL CALC-MCNC: 82 MG/DL
NONHDLC SERPL-MCNC: 92 MG/DL
POTASSIUM SERPL-SCNC: 4.3 MMOL/L (ref 3.4–5.3)
SODIUM SERPL-SCNC: 142 MMOL/L (ref 133–144)
TRIGL SERPL-MCNC: 50 MG/DL

## 2018-09-19 PROCEDURE — 36415 COLL VENOUS BLD VENIPUNCTURE: CPT | Performed by: PEDIATRICS

## 2018-09-19 PROCEDURE — 87389 HIV-1 AG W/HIV-1&-2 AB AG IA: CPT | Performed by: PEDIATRICS

## 2018-09-19 PROCEDURE — 90471 IMMUNIZATION ADMIN: CPT | Performed by: PEDIATRICS

## 2018-09-19 PROCEDURE — 99396 PREV VISIT EST AGE 40-64: CPT | Performed by: PEDIATRICS

## 2018-09-19 PROCEDURE — 90686 IIV4 VACC NO PRSV 0.5 ML IM: CPT | Performed by: PEDIATRICS

## 2018-09-19 PROCEDURE — 80061 LIPID PANEL: CPT | Performed by: PEDIATRICS

## 2018-09-19 PROCEDURE — 80048 BASIC METABOLIC PNL TOTAL CA: CPT | Performed by: PEDIATRICS

## 2018-09-19 ASSESSMENT — ENCOUNTER SYMPTOMS
JOINT SWELLING: 0
PARESTHESIAS: 0
HEMATOCHEZIA: 0
DIZZINESS: 0
FEVER: 0
SHORTNESS OF BREATH: 0
COUGH: 0
DIARRHEA: 0
WEAKNESS: 0
HEADACHES: 0
ABDOMINAL PAIN: 0
NERVOUS/ANXIOUS: 0
FREQUENCY: 0
DYSURIA: 0
PALPITATIONS: 0
MYALGIAS: 0
BREAST MASS: 0
CONSTIPATION: 0
HEMATURIA: 0
CHILLS: 0
NAUSEA: 0
EYE PAIN: 0
SORE THROAT: 0
ARTHRALGIAS: 0
HEARTBURN: 0

## 2018-09-19 NOTE — PATIENT INSTRUCTIONS
Schedule mammogram at [facility] [phone]    Recommend new Shingles vaccine.  This is being given at the pharmacy. Check with your insurance to see if this is covered.  You can also go to any pharmacy and they can check to see if you are covered and give you the shot.      Preventive Health Recommendations  Female Ages 50 - 64    Yearly exam: See your health care provider every year in order to  o Review health changes.   o Discuss preventive care.    o Review your medicines if your doctor has prescribed any.      Get a Pap test every three years (unless you have an abnormal result and your provider advises testing more often).    If you get Pap tests with HPV test, you only need to test every 5 years, unless you have an abnormal result.     You do not need a Pap test if your uterus was removed (hysterectomy) and you have not had cancer.    You should be tested each year for STDs (sexually transmitted diseases) if you're at risk.     Have a mammogram every 1 to 2 years.    Have a colonoscopy at age 50, or have a yearly FIT test (stool test). These exams screen for colon cancer.      Have a cholesterol test every 5 years, or more often if advised.    Have a diabetes test (fasting glucose) every three years. If you are at risk for diabetes, you should have this test more often.     If you are at risk for osteoporosis (brittle bone disease), think about having a bone density scan (DEXA).    Shots: Get a flu shot each year. Get a tetanus shot every 10 years.    Nutrition:     Eat at least 5 servings of fruits and vegetables each day.    Eat whole-grain bread, whole-wheat pasta and brown rice instead of white grains and rice.    Get adequate Calcium and Vitamin D.     Lifestyle    Exercise at least 150 minutes a week (30 minutes a day, 5 days a week). This will help you control your weight and prevent disease.    Limit alcohol to one drink per day.    No smoking.     Wear sunscreen to prevent skin cancer.      See your dentist every six months for an exam and cleaning.    See your eye doctor every 1 to 2 years.

## 2018-09-19 NOTE — MR AVS SNAPSHOT
After Visit Summary   9/19/2018    Damion Stanley    MRN: [id number]           Patient Information     Date Of Birth          1960        Visit Information        Provider Department      9/19/2018 10:00 AM Anushka Moctezuma MD [facility]        Today's Diagnoses     Screening for cardiovascular condition    -  1    Screening for diabetes mellitus        Visit for screening mammogram        Screening for HIV (human immunodeficiency virus)          Care Instructions    Schedule mammogram at [facility] [phone]    Recommend new Shingles vaccine.  This is being given at the pharmacy. Check with your insurance to see if this is covered.  You can also go to any pharmacy and they can check to see if you are covered and give you the shot.      Preventive Health Recommendations  Female Ages 50 - 64    Yearly exam: See your health care provider every year in order to  o Review health changes.   o Discuss preventive care.    o Review your medicines if your doctor has prescribed any.      Get a Pap test every three years (unless you have an abnormal result and your provider advises testing more often).    If you get Pap tests with HPV test, you only need to test every 5 years, unless you have an abnormal result.     You do not need a Pap test if your uterus was removed (hysterectomy) and you have not had cancer.    You should be tested each year for STDs (sexually transmitted diseases) if you're at risk.     Have a mammogram every 1 to 2 years.    Have a colonoscopy at age 50, or have a yearly FIT test (stool test). These exams screen for colon cancer.      Have a cholesterol test every 5 years, or more often if advised.    Have a diabetes test (fasting glucose) every three years. If you are at risk for diabetes, you should have this test more often.     If you are at risk for osteoporosis (brittle bone disease), think about having a bone density scan (DEXA).    Shots: Get a  flu shot each year. Get a tetanus shot every 10 years.    Nutrition:     Eat at least 5 servings of fruits and vegetables each day.    Eat whole-grain bread, whole-wheat pasta and brown rice instead of white grains and rice.    Get adequate Calcium and Vitamin D.     Lifestyle    Exercise at least 150 minutes a week (30 minutes a day, 5 days a week). This will help you control your weight and prevent disease.    Limit alcohol to one drink per day.    No smoking.     Wear sunscreen to prevent skin cancer.     See your dentist every six months for an exam and cleaning.    See your eye doctor every 1 to 2 years.            Follow-ups after your visit        Follow-up notes from your care team     Return in about 1 week (around 9/26/2018) for BP Recheck - nurse only.      Future tests that were ordered for you today     Open Future Orders        Priority Expected Expires Ordered    MA Screening Digital Bilateral Routine  9/19/2019 9/19/2018            Who to contact     If you have questions or need follow up information about today's clinic visit or your schedule please contact [facility] directly at [phone].  Normal or non-critical lab and imaging results will be communicated to you by FiveRunshart, letter or phone within 4 business days after the clinic has received the results. If you do not hear from us within 7 days, please contact the clinic through [employer] or phone. If you have a critical or abnormal lab result, we will notify you by phone as soon as possible.  Submit refill requests through [employer] or call your pharmacy and they will forward the refill request to us. Please allow 3 business days for your refill to be completed.          Additional Information About Your Visit        [employer] Information     [employer] gives you secure access to your electronic health record. If you see a primary care provider, you can also send messages to your care team and make appointments. If you have questions,  "please call your primary care clinic.  If you do not have a primary care provider, please call [phone] and they will assist you.        Care EveryWhere ID     This is your Care EveryWhere ID. This could be used by other organizations to access your [place] medical records  [id number]        Your Vitals Were     Pulse Temperature Height Last Period Pulse Oximetry BMI (Body Mass Index)    72 98.2  F (36.8  C) (Oral) 5' 4.5\" (1.638 m) 01/07/2006 97% 32.62 kg/m2       Blood Pressure from Last 3 Encounters:   09/19/18 (!) 154/92   09/13/18 130/60   06/12/18 154/84    Weight from Last 3 Encounters:   09/19/18 193 lb (87.5 kg)   09/13/18 190 lb (86.2 kg)   06/12/18 191 lb 8 oz (86.9 kg)              We Performed the Following     Basic metabolic panel     HIV Antigen Antibody Combo     Lipid Profile (Chol, Trig, HDL, LDL calc)        Primary Care Provider Office Phone # Fax #    Anushka Moctezuma [phone] [address] [place] [address]        Equal Access to Services     [facility] AH: Hadii aad ku hadasho Soomaali, waaxda luqadaha, qaybta kaalmada adeegyada, patrice biswasin haycarmenn danielle guido la'aan ah. So [facility] [phone].    ATENCIÓN: Si habla español, tiene a singer disposición servicios gratuitos de asistencia lingüística. LlThe [facility] [phone].    We comply with applicable federal civil rights laws and [place] laws. We do not discriminate on the basis of race, color, national origin, age, disability, sex, sexual orientation, or gender identity.            Thank you!     Thank you for choosing [facility]  for your care. Our goal is always to provide you with excellent care. Hearing back from our patients is one way we can continue to improve our services. Please take a few minutes to complete the written survey that you may receive in the mail after your visit with us. Thank you!             Your Updated Medication List - Protect others around you: Learn how to " safely use, store and throw away your medicines at www.disposemymeds.org.          This list is accurate as of 9/19/18 10:35 AM.  Always use your most recent med list.                   Brand Name Dispense Instructions for use Diagnosis    calcium carbonate 600 MG tablet   Generic drug:  calcium      Take 1 tablet by mouth. Take one tablet daily with Breakfast        losartan 25 MG tablet    COZAAR    15 tablet    TAKE 1/2 TABLET(12.5 MG) BY MOUTH DAILY    Benign essential hypertension       MULTI-VITAMIN DAILY PO      Take  by mouth.        nitroFURantoin (macrocrystal-monohydrate) 100 MG capsule    MACROBID    14 capsule    Take 1 capsule (100 mg) by mouth 2 times daily    Acute cystitis with hematuria       Vitamin D-3 5000 units Tabs      Take 1 tablet by mouth. One tablet daily

## 2018-09-19 NOTE — PROGRESS NOTES
SUBJECTIVE:   CC: Damion Stanley is an 57 year old woman who presents for preventive health visit.     Physical   Annual:     Getting at least 3 servings of Calcium per day:  Yes    Bi-annual eye exam:  Yes    Dental care twice a year:  Yes    Sleep apnea or symptoms of sleep apnea:  None    Diet:  Carbohydrate counting    Frequency of exercise:  6-7 days/week    Duration of exercise:  Greater than 60 minutes    Taking medications regularly:  No    Barriers to taking medications:  Side effects    Medication side effects:  Other    Patient has stopped her losartan for a few days - felt it was giving her palpitations at night and also a face rash.  She states BP last week seemed ok w/o it.    Today's PHQ-2 Score:   PHQ-2 ( 1999 Pfizer) 9/16/2018   Q1: Little interest or pleasure in doing things 0   Q2: Feeling down, depressed or hopeless 0   PHQ-2 Score 0   Q1: Little interest or pleasure in doing things Not at all   Q2: Feeling down, depressed or hopeless Not at all   PHQ-2 Score 0       Abuse: Current or Past(Physical, Sexual or Emotional)- No  Do you feel safe in your environment - Yes    Social History   Substance Use Topics     Smoking status: Never Smoker     Smokeless tobacco: Never Used     Alcohol use 0.0 oz/week     0 Standard drinks or equivalent per week      Comment: glass of wine occasionally     Alcohol Use 9/16/2018   If you drink alcohol do you typically have greater than 3 drinks per day OR greater than 7 drinks per week? No       Reviewed orders with patient.  Reviewed health maintenance and updated orders accordingly - Yes  BP Readings from Last 3 Encounters:   09/20/18 134/90   09/13/18 130/60   06/12/18 154/84    Wt Readings from Last 3 Encounters:   09/19/18 193 lb (87.5 kg)   09/13/18 190 lb (86.2 kg)   06/12/18 191 lb 8 oz (86.9 kg)                    Patient over age 50, mutual decision to screen reflected in health maintenance.    Pertinent mammograms are reviewed under the imaging  "tab.  History of abnormal Pap smear: Status post benign hysterectomy. Health Maintenance and Surgical History updated.  PAP / HPV 1/18/2006   PAP NIL     Reviewed and updated as needed this visit by clinical staff  Tobacco  Allergies  Meds  Med Hx  Surg Hx  Fam Hx  Soc Hx        Reviewed and updated as needed this visit by Provider            Review of Systems   Constitutional: Negative for chills and fever.   HENT: Negative for congestion, ear pain, hearing loss and sore throat.    Eyes: Negative for pain and visual disturbance.   Respiratory: Negative for cough and shortness of breath.    Cardiovascular: Negative for chest pain, palpitations and peripheral edema.   Gastrointestinal: Negative for abdominal pain, constipation, diarrhea, heartburn, hematochezia and nausea.   Breasts:  Negative for tenderness, breast mass and discharge.   Genitourinary: Negative for dysuria, frequency, genital sores, hematuria, pelvic pain, urgency, vaginal bleeding and vaginal discharge.   Musculoskeletal: Negative for arthralgias, joint swelling and myalgias.   Skin: Negative for rash.   Neurological: Negative for dizziness, weakness, headaches and paresthesias.   Psychiatric/Behavioral: Negative for mood changes. The patient is not nervous/anxious.            OBJECTIVE:   /90  Pulse 72  Temp 98.2  F (36.8  C) (Oral)  Ht 5' 4.5\" (1.638 m)  Wt 193 lb (87.5 kg)  LMP 01/07/2006  SpO2 97%  BMI 32.62 kg/m2  Physical Exam  GENERAL APPEARANCE: healthy, alert and no distress  EYES: Eyes grossly normal to inspection, PERRL and conjunctivae and sclerae normal  HENT: ear canals and TM's normal, nose and mouth without ulcers or lesions, oropharynx clear and oral mucous membranes moist  NECK: no adenopathy, no asymmetry, masses, or scars and thyroid normal to palpation  RESP: lungs clear to auscultation - no rales, rhonchi or wheezes  BREAST: normal without masses, tenderness or nipple discharge and no palpable axillary masses " "or adenopathy  CV: regular rate and rhythm, normal S1 S2, no S3 or S4, no murmur, click or rub, no peripheral edema and peripheral pulses strong  ABDOMEN: soft, nontender, no hepatosplenomegaly, no masses and bowel sounds normal  MS: no musculoskeletal defects are noted and gait is age appropriate without ataxia  SKIN: no suspicious lesions or rashes  NEURO: Normal strength and tone, sensory exam grossly normal, mentation intact and speech normal  PSYCH: mentation appears normal and affect normal/bright    Diagnostic Test Results:  none     ASSESSMENT/PLAN:   1. Routine general medical examination at a health care facility    2. Screening for cardiovascular condition  - Lipid Profile (Chol, Trig, HDL, LDL calc)    3. Screening for diabetes mellitus  - Basic metabolic panel    4. Visit for screening mammogram  - MA Screening Digital Bilateral; Future    5. Screening for HIV (human immunodeficiency virus)    - HIV Antigen Antibody Combo    6. Need for prophylactic vaccination and inoculation against influenza  - FLU VACCINE, SPLIT VIRUS, IM (QUADRIVALENT) [60831]- >3 YRS    7. Benign essential hypertension  Repeat borderline - patient really does not want to be on medications - will recheck with nurse only in 1 week - she has multiple intolerances to medications - discussed with patient that we will try beta blocker next.       COUNSELING:  Reviewed preventive health counseling, as reflected in patient instructions    BP Readings from Last 1 Encounters:   09/20/18 134/90     Estimated body mass index is 32.62 kg/(m^2) as calculated from the following:    Height as of this encounter: 5' 4.5\" (1.638 m).    Weight as of this encounter: 193 lb (87.5 kg).      Weight management plan: continue current routine     reports that she has never smoked. She has never used smokeless tobacco.      Counseling Resources:  ATP IV Guidelines  Pooled Cohorts Equation Calculator  Breast Cancer Risk Calculator  FRAX Risk Assessment  ICSI " Preventive Guidelines  Dietary Guidelines for Americans, 2010  USDA's MyPlate  ASA Prophylaxis  Lung CA Screening    Anushka Moctezuma MD  [facility]    Injectable Influenza Immunization Documentation    1.  Is the person to be vaccinated sick today?   No    2. Does the person to be vaccinated have an allergy to a component   of the vaccine?   No  Egg Allergy Algorithm Link    3. Has the person to be vaccinated ever had a serious reaction   to influenza vaccine in the past?   No    4. Has the person to be vaccinated ever had Guillain-Barré syndrome?   No    Form completed by Saba Ahumada MA

## 2018-09-20 VITALS
HEART RATE: 72 BPM | OXYGEN SATURATION: 97 % | DIASTOLIC BLOOD PRESSURE: 90 MMHG | WEIGHT: 193 LBS | SYSTOLIC BLOOD PRESSURE: 134 MMHG | BODY MASS INDEX: 32.15 KG/M2 | TEMPERATURE: 98.2 F | HEIGHT: 65 IN

## 2018-09-20 PROBLEM — I10 BENIGN ESSENTIAL HYPERTENSION: Status: ACTIVE | Noted: 2018-09-20

## 2018-09-20 LAB — HIV 1+2 AB+HIV1 P24 AG SERPL QL IA: NONREACTIVE

## 2018-09-28 ENCOUNTER — MYC MEDICAL ADVICE (OUTPATIENT)
Dept: PEDIATRICS | Facility: CLINIC | Age: 58
End: 2018-09-28

## 2018-09-28 NOTE — TELEPHONE ENCOUNTER
Patient is asking for prednisone for eyelid irritation-she states she mentioned it at the last appointment-I do not see any notes about this.  Since this is a request for new medication, e-visit was advised.  Angelica Goel RN  Message handled by Nurse Triage.

## 2018-10-05 ENCOUNTER — ALLIED HEALTH/NURSE VISIT (OUTPATIENT)
Dept: NURSING | Facility: CLINIC | Age: 58
End: 2018-10-05
Payer: COMMERCIAL

## 2018-10-05 VITALS — HEART RATE: 76 BPM | DIASTOLIC BLOOD PRESSURE: 82 MMHG | SYSTOLIC BLOOD PRESSURE: 134 MMHG

## 2018-10-05 DIAGNOSIS — I10 BENIGN ESSENTIAL HYPERTENSION: Primary | ICD-10-CM

## 2018-10-05 PROCEDURE — 99207 ZZC NO CHARGE NURSE ONLY: CPT

## 2018-10-05 NOTE — Clinical Note
Please see BP readings from today's nurse visit. Dr. Moctezuma out until 10/10/18.  Tati Mayo MA  October 5, 2018,  10:20 AM

## 2018-10-05 NOTE — NURSING NOTE
Damion Stanley is a 57 year old patient who comes in today for a Blood Pressure check.  Initial BP:  132/88 (BP Location: Right arm, Cuff Size: Adult Large), Pulse 78  Second BP: 134/82 (BP locaiton: Right arm, Cuff Size: Adult Large), Pulse 76  Disposition: results routed to provider    Tati Mayo MA   October 5, 2018,  10:18 AM

## 2018-10-05 NOTE — MR AVS SNAPSHOT
After Visit Summary   10/5/2018    Damion Stanley    MRN: [id number]           Patient Information     Date Of Birth          1960        Visit Information        Provider Department      10/5/2018 10:00 AM TAMELA NURSE AB [facility] [place]        Today's Diagnoses     Benign essential hypertension    -  1       Follow-ups after your visit        Who to contact     If you have questions or need follow up information about today's clinic visit or your schedule please contact [facility] directly at [phone].  Normal or non-critical lab and imaging results will be communicated to you by MyChart, letter or phone within 4 business days after the clinic has received the results. If you do not hear from us within 7 days, please contact the clinic through [employer] or phone. If you have a critical or abnormal lab result, we will notify you by phone as soon as possible.  Submit refill requests through [employer] or call your pharmacy and they will forward the refill request to us. Please allow 3 business days for your refill to be completed.          Additional Information About Your Visit        MyChart Information     [employer] gives you secure access to your electronic health record. If you see a primary care provider, you can also send messages to your care team and make appointments. If you have questions, please call your primary care clinic.  If you do not have a primary care provider, please call [phone] and they will assist you.        Care EveryWhere ID     This is your Care EveryWhere ID. This could be used by other organizations to access your [place] medical records  [id number]        Your Vitals Were     Pulse Last Period                76 01/07/2006           Blood Pressure from Last 3 Encounters:   10/05/18 134/82   09/20/18 134/90   09/13/18 130/60    Weight from Last 3 Encounters:   09/19/18 193 lb (87.5 kg)   09/13/18 190 lb (86.2 kg)   06/12/18 191 lb 8 oz (86.9 kg)               Today, you had the following     No orders found for display       Primary Care Provider Office Phone # Fax #    Anushka Moctezuma [phone] [address] [place] [address]        Equal Access to Services     [facility]AC[facility] WHITE : Hadii chris ku hadaristeoo Soomaali, waaxda luqadaha, qaybta kaalmada adeegyada, waxgiancarlo andersonn danielle guido lagaryabril panchal. So [facility] [phone].    ATENCIÓN: Si habla español, tiene a singer disposición servicios gratuitos de asistencia lingüística. Llame al [phone].    We comply with applicable federal civil rights laws and [place] laws. We do not discriminate on the basis of race, color, national origin, age, disability, sex, sexual orientation, or gender identity.            Thank you!     Thank you for choosing [facility]  for your care. Our goal is always to provide you with excellent care. Hearing back from our patients is one way we can continue to improve our services. Please take a few minutes to complete the written survey that you may receive in the mail after your visit with us. Thank you!             Your Updated Medication List - Protect others around you: Learn how to safely use, store and throw away your medicines at www.disposemymeds.org.          This list is accurate as of 10/5/18 10:20 AM.  Always use your most recent med list.                   Brand Name Dispense Instructions for use Diagnosis    calcium carbonate 600 MG tablet   Generic drug:  calcium      Take 1 tablet by mouth. Take one tablet daily with Breakfast        MULTI-VITAMIN DAILY PO      Take  by mouth.        nitroFURantoin (macrocrystal-monohydrate) 100 MG capsule    MACROBID    14 capsule    Take 1 capsule (100 mg) by mouth 2 times daily    Acute cystitis with hematuria       Vitamin D-3 5000 units Tabs      Take 1 tablet by mouth. One tablet daily

## 2018-11-19 ENCOUNTER — HOSPITAL ENCOUNTER (OUTPATIENT)
Dept: MAMMOGRAPHY | Facility: CLINIC | Age: 58
Discharge: HOME | End: 2018-11-19
Attending: PEDIATRICS | Admitting: PEDIATRICS
Payer: COMMERCIAL

## 2018-11-19 DIAGNOSIS — Z12.31 VISIT FOR SCREENING MAMMOGRAM: ICD-10-CM

## 2018-11-19 PROCEDURE — 77063 BREAST TOMOSYNTHESIS BI: CPT

## 2018-12-07 ENCOUNTER — TRANSFERRED RECORDS (OUTPATIENT)
Dept: HEALTH INFORMATION MANAGEMENT | Facility: CLINIC | Age: 58
End: 2018-12-07

## 2019-03-28 ENCOUNTER — OFFICE VISIT (OUTPATIENT)
Dept: PEDIATRICS | Facility: CLINIC | Age: 59
End: 2019-03-28
Payer: COMMERCIAL

## 2019-03-28 VITALS
RESPIRATION RATE: 16 BRPM | HEIGHT: 65 IN | OXYGEN SATURATION: 97 % | TEMPERATURE: 97.8 F | HEART RATE: 66 BPM | BODY MASS INDEX: 31.72 KG/M2 | DIASTOLIC BLOOD PRESSURE: 94 MMHG | SYSTOLIC BLOOD PRESSURE: 148 MMHG | WEIGHT: 190.4 LBS

## 2019-03-28 DIAGNOSIS — M79.661 RIGHT CALF PAIN: Primary | ICD-10-CM

## 2019-03-28 DIAGNOSIS — I10 BENIGN ESSENTIAL HYPERTENSION: ICD-10-CM

## 2019-03-28 LAB — D DIMER PPP FEU-MCNC: <0.3 UG/ML FEU (ref 0–0.5)

## 2019-03-28 PROCEDURE — 99214 OFFICE O/P EST MOD 30 MIN: CPT | Performed by: PHYSICIAN ASSISTANT

## 2019-03-28 PROCEDURE — 36415 COLL VENOUS BLD VENIPUNCTURE: CPT | Performed by: PHYSICIAN ASSISTANT

## 2019-03-28 PROCEDURE — 85379 FIBRIN DEGRADATION QUANT: CPT | Performed by: PHYSICIAN ASSISTANT

## 2019-03-28 ASSESSMENT — ENCOUNTER SYMPTOMS
ABDOMINAL PAIN: 0
DIARRHEA: 0
SHORTNESS OF BREATH: 0
CHILLS: 0
MYALGIAS: 1
FEVER: 0
VOMITING: 0
HEADACHES: 0
FOCAL WEAKNESS: 0
NAUSEA: 0

## 2019-03-28 ASSESSMENT — MIFFLIN-ST. JEOR: SCORE: 1434.53

## 2019-03-28 NOTE — PROGRESS NOTES
HPI    SUBJECTIVE:   Damion Stanley is a 58 year old female who presents to clinic today for the following health issues:    Calf Pain    Onset: last night    Description:   Location: Right Upper Calf  Character: Sharp and Shooting    Intensity:Varies throughout day 2-9/10     Progression of Symptoms: worse    Accompanying Signs & Symptoms:  Other symptoms: none-Just localized pain    History:   Previous similar pain: no       Precipitating factors:   Trauma or overuse: YES- Potential Overuse; exercises a lot/very active    Alleviating factors:  Improved by: Sitting Still    Therapies Tried and outcome: No therapies have been tried.     Pain is worse with weight bearing/activity.  Denies numbness/tingling, warmth, erythema, bruising, fever/chills, or radiation of the pain.  Patient denies history of DVT/PE, recent travel/surgery, estrogen use, tobacco use, or history of cancer. No family hx of VTE.    Additional issues:  BP high today. Pt has hx HTN, controlled thru diet & exercise. Has tried several medications but had side effects from many of them. Checks it at home and it is usually 130s/80s    Chart Review:  PHQ-9 SCORE 12/20/2012 12/24/2012   PHQ-9 Total Score 8 9     No flowsheet data found.    Patient Active Problem List   Diagnosis     Pain in joint, upper arm     Headache     CARDIOVASCULAR SCREENING; LDL GOAL LESS THAN 160     Menopause     Vitamin D deficiency     Hormone replacement therapy (HRT)     Impetigo     Senile sebaceous gland hyperplasia     Dermal nevus     Class 1 obesity due to excess calories without serious comorbidity with body mass index (BMI) of 31.0 to 31.9 in adult     Benign essential hypertension     Past Surgical History:   Procedure Laterality Date     HC TOOTH EXTRACTION W/FORCEP       HYSTERECTOMY, PAP NO LONGER INDICATED       HYSTERECTOMY, SOLEDAD  3/2006    ovaries intact     Family History   Problem Relation Age of Onset     Diabetes Father         Type 2     Hypertension  "Father      Cancer Father         mantle cell lymphoma     Breast Cancer Mother         Age 54     C.A.D. Paternal Grandfather         Age 60     Cancer Maternal Grandfather         Lung-smoker     Neurologic Disorder Maternal Grandmother         MS      Social History     Tobacco Use     Smoking status: Never Smoker     Smokeless tobacco: Never Used   Substance Use Topics     Alcohol use: Yes     Alcohol/week: 0.0 oz     Comment: glass of wine occasionally        Problem list, Medication list, Allergies, Medical/Social/Surg hx reviewed in [facility], updated as appropriate.      Review of Systems   Constitutional: Negative for chills and fever.   Respiratory: Negative for shortness of breath.    Cardiovascular: Negative for chest pain.   Gastrointestinal: Negative for abdominal pain, diarrhea, nausea and vomiting.   Musculoskeletal: Positive for myalgias.   Skin: Negative for rash.   Neurological: Negative for focal weakness and headaches.   All other systems reviewed and are negative.        Physical Exam   Constitutional: She is oriented to person, place, and time.   HENT:   Head: Normocephalic and atraumatic.   Cardiovascular: Normal rate, regular rhythm and normal heart sounds.   Pulmonary/Chest: Effort normal and breath sounds normal.   Musculoskeletal: Normal range of motion.        Right knee: Normal.        Right ankle: Normal.        Right lower leg: She exhibits no tenderness.        Legs:  Neurological: She is alert and oriented to person, place, and time.   Skin: Skin is warm and dry. No bruising noted. No erythema.   Nursing note and vitals reviewed.    Vital Signs  BP (!) 148/94 (BP Location: Right arm, Patient Position: Chair, Cuff Size: Adult Large)   Pulse 66   Temp 97.8  F (36.6  C) (Oral)   Resp 16   Ht 1.651 m (5' 5\")   Wt 86.4 kg (190 lb 6.4 oz)   LMP 01/07/2006   SpO2 97%   BMI 31.68 kg/m     Body mass index is 31.68 kg/m .    Diagnostic Test Results:  Results for orders placed or performed " in visit on 03/28/19 (from the past 24 hour(s))   D dimer, quantitative   Result Value Ref Range    D Dimer <0.3 0.0 - 0.50 ug/ml FEU       ASSESSMENT/PLAN:                                                        ICD-10-CM    1. Right calf pain M79.661 D dimer, quantitative     order for DME   2. Benign essential hypertension I10    D dimer negative. Suspect muscle strain as pain is worse with weight bearing & activity. Pt given crutches, heat & NSAIDs also recommended.  BP has been < 140/90 at home; suspect elevated today due to anxiety/discomfort. Continue to monitor BP at home, and return to clinic if elevated.    I have discussed any lab or imaging results, the patient's diagnosis, and my plan of treatment with the patient and/or family. Patient is aware to come back in if with worsening symptoms or if no relief despite treatment plan.  Patient voiced understanding and had no further questions.       Follow Up: Return in about 2 weeks (around 4/11/2019) for Follow up w/ PCP if not better.    ANGELICA Rivera, PA-C  [facility] [place]

## 2019-03-28 NOTE — PROGRESS NOTES
"  SUBJECTIVE:   Damion Stanley is a 58 year old female who presents to clinic today for the following health issues:    Pt states she remains very active throughout week. Had dental work yesterday to have a filling replaced. No family history of blood clots     Calf Pain    Onset: Ongoing for One Day     Description:   Location: Right Upper Calf  Character: Sharp and Shooting    Intensity:Varies throughout day 2-9/10     Progression of Symptoms: worse    Accompanying Signs & Symptoms:  Other symptoms: none-Just localized pain    History:   Previous similar pain: no       Precipitating factors:   Trauma or overuse: YES- Potential Overuse    Alleviating factors:  Improved by: Sitting Still    Therapies Tried and outcome: No therapies have been tried.     130s/80s at home when she checks it    {additional problems for provider to add:603351}    Problem list and histories reviewed & adjusted, as indicated.  Additional history: {NONE - AS DOCUMENTED:258279::\"as documented\"}    {HIST REVIEW/ LINKS 2:280484}    Reviewed and updated as needed this visit by clinical staff  Tobacco  Allergies  Meds  Med Hx  Surg Hx  Fam Hx  Soc Hx      Reviewed and updated as needed this visit by Provider         {PROVIDER CHARTING PREFERENCE:531699}    "

## 2019-06-25 ENCOUNTER — TRANSFERRED RECORDS (OUTPATIENT)
Dept: HEALTH INFORMATION MANAGEMENT | Facility: CLINIC | Age: 59
End: 2019-06-25

## 2019-10-18 ASSESSMENT — ENCOUNTER SYMPTOMS
NERVOUS/ANXIOUS: 0
NAUSEA: 0
BREAST MASS: 0
PARESTHESIAS: 0
FREQUENCY: 0
ARTHRALGIAS: 0
DYSURIA: 0
HEMATOCHEZIA: 0
ABDOMINAL PAIN: 0
DIZZINESS: 0
CHILLS: 0
SHORTNESS OF BREATH: 0
JOINT SWELLING: 0
COUGH: 0
MYALGIAS: 0
CONSTIPATION: 0
PALPITATIONS: 0
HEMATURIA: 0
SORE THROAT: 0
FEVER: 0
HEADACHES: 0
DIARRHEA: 0
HEARTBURN: 0
EYE PAIN: 0
WEAKNESS: 0

## 2019-10-21 ENCOUNTER — OFFICE VISIT (OUTPATIENT)
Dept: PEDIATRICS | Facility: CLINIC | Age: 59
End: 2019-10-21
Payer: COMMERCIAL

## 2019-10-21 VITALS
HEIGHT: 65 IN | OXYGEN SATURATION: 99 % | WEIGHT: 194.9 LBS | TEMPERATURE: 97.9 F | DIASTOLIC BLOOD PRESSURE: 88 MMHG | SYSTOLIC BLOOD PRESSURE: 162 MMHG | HEART RATE: 75 BPM | RESPIRATION RATE: 16 BRPM | BODY MASS INDEX: 32.47 KG/M2

## 2019-10-21 DIAGNOSIS — Z00.00 ROUTINE GENERAL MEDICAL EXAMINATION AT A HEALTH CARE FACILITY: Primary | ICD-10-CM

## 2019-10-21 DIAGNOSIS — E55.9 VITAMIN D DEFICIENCY: ICD-10-CM

## 2019-10-21 DIAGNOSIS — Z12.31 VISIT FOR SCREENING MAMMOGRAM: ICD-10-CM

## 2019-10-21 DIAGNOSIS — Z12.11 SCREENING FOR COLON CANCER: ICD-10-CM

## 2019-10-21 DIAGNOSIS — Z23 NEED FOR INFLUENZA VACCINATION: ICD-10-CM

## 2019-10-21 DIAGNOSIS — I10 BENIGN ESSENTIAL HYPERTENSION: ICD-10-CM

## 2019-10-21 PROCEDURE — 90682 RIV4 VACC RECOMBINANT DNA IM: CPT | Performed by: NURSE PRACTITIONER

## 2019-10-21 PROCEDURE — 82306 VITAMIN D 25 HYDROXY: CPT | Performed by: NURSE PRACTITIONER

## 2019-10-21 PROCEDURE — 99396 PREV VISIT EST AGE 40-64: CPT | Mod: 25 | Performed by: NURSE PRACTITIONER

## 2019-10-21 PROCEDURE — 99213 OFFICE O/P EST LOW 20 MIN: CPT | Mod: 25 | Performed by: NURSE PRACTITIONER

## 2019-10-21 PROCEDURE — 36415 COLL VENOUS BLD VENIPUNCTURE: CPT | Performed by: NURSE PRACTITIONER

## 2019-10-21 PROCEDURE — 90471 IMMUNIZATION ADMIN: CPT | Performed by: NURSE PRACTITIONER

## 2019-10-21 PROCEDURE — 80048 BASIC METABOLIC PNL TOTAL CA: CPT | Performed by: NURSE PRACTITIONER

## 2019-10-21 RX ORDER — METOPROLOL SUCCINATE 25 MG/1
25 TABLET, EXTENDED RELEASE ORAL DAILY
Qty: 90 TABLET | Refills: 0 | Status: SHIPPED | OUTPATIENT
Start: 2019-10-21 | End: 2019-11-20

## 2019-10-21 ASSESSMENT — ENCOUNTER SYMPTOMS
SHORTNESS OF BREATH: 0
HEARTBURN: 0
NAUSEA: 0
SORE THROAT: 0
PARESTHESIAS: 0
EYE PAIN: 0
ABDOMINAL PAIN: 0
HEADACHES: 0
DIARRHEA: 0
DIZZINESS: 0
MYALGIAS: 0
COUGH: 0
FREQUENCY: 0
FEVER: 0
CHILLS: 0
PALPITATIONS: 0
HEMATOCHEZIA: 0
DYSURIA: 0
BREAST MASS: 0
CONSTIPATION: 0
JOINT SWELLING: 0
HEMATURIA: 0
ARTHRALGIAS: 0
NERVOUS/ANXIOUS: 0
WEAKNESS: 0

## 2019-10-21 ASSESSMENT — MIFFLIN-ST. JEOR: SCORE: 1454.94

## 2019-10-21 NOTE — PATIENT INSTRUCTIONS
Start the new BP medication and schedule a BP check in 2-3 wks.       Preventive Health Recommendations  Female Ages 50 - 64    Yearly exam: See your health care provider every year in order to  o Review health changes.   o Discuss preventive care.    o Review your medicines if your doctor has prescribed any.      Get a Pap test every three years (unless you have an abnormal result and your provider advises testing more often).    If you get Pap tests with HPV test, you only need to test every 5 years, unless you have an abnormal result.     You do not need a Pap test if your uterus was removed (hysterectomy) and you have not had cancer.    You should be tested each year for STDs (sexually transmitted diseases) if you're at risk.     Have a mammogram every 1 to 2 years.    Have a colonoscopy at age 50, or have a yearly FIT test (stool test). These exams screen for colon cancer.      Have a cholesterol test every 5 years, or more often if advised.    Have a diabetes test (fasting glucose) every three years. If you are at risk for diabetes, you should have this test more often.     If you are at risk for osteoporosis (brittle bone disease), think about having a bone density scan (DEXA).    Shots: Get a flu shot each year. Get a tetanus shot every 10 years.    Nutrition:     Eat at least 5 servings of fruits and vegetables each day.    Eat whole-grain bread, whole-wheat pasta and brown rice instead of white grains and rice.    Get adequate Calcium and Vitamin D.     Lifestyle    Exercise at least 150 minutes a week (30 minutes a day, 5 days a week). This will help you control your weight and prevent disease.    Limit alcohol to one drink per day.    No smoking.     Wear sunscreen to prevent skin cancer.     See your dentist every six months for an exam and cleaning.    See your eye doctor every 1 to 2 years.

## 2019-10-21 NOTE — PROGRESS NOTES
SUBJECTIVE:   CC: Damion Stanley is an 58 year old woman who presents for preventive health visit.     Healthy Habits:     Getting at least 3 servings of Calcium per day:  Yes    Bi-annual eye exam:  Yes    Dental care twice a year:  Yes    Sleep apnea or symptoms of sleep apnea:  None    Diet:  Carbohydrate counting    Frequency of exercise:  4-5 days/week    Duration of exercise:  45-60 minutes    Taking medications regularly:  Yes    Barriers to taking medications:  None    Medication side effects:  Not applicable    PHQ-2 Total Score: 0    Additional concerns today:  No      History of htn, had been on medications in the past with SEs and is no longer on meds. She has tried diuretics, ACE, ARB, CCB. Hd considered BB with PCP.     BP Readings from Last 3 Encounters:   10/21/19 (!) 162/88   03/28/19 (!) 148/94   10/05/18 134/82     Wt Readings from Last 4 Encounters:   10/21/19 88.4 kg (194 lb 14.4 oz)   03/28/19 86.4 kg (190 lb 6.4 oz)   09/19/18 87.5 kg (193 lb)   09/13/18 86.2 kg (190 lb)     LMP 2007, hysterectomy. Hot flashes have improved. Has had difficulty with weight, exercises and watches eating.     Today's PHQ-2 Score:   PHQ-2 ( 1999 Pf[employer]) 10/18/2019   Q1: Little interest or pleasure in doing things 0   Q2: Feeling down, depressed or hopeless 0   PHQ-2 Score 0   Q1: Little interest or pleasure in doing things Not at all   Q2: Feeling down, depressed or hopeless Not at all   PHQ-2 Score 0       Abuse: Current or Past(Physical, Sexual or Emotional)- No  Do you feel safe in your environment? Yes    Social History     Tobacco Use     Smoking status: Never Smoker     Smokeless tobacco: Never Used   Substance Use Topics     Alcohol use: Yes     Alcohol/week: 0.0 standard drinks     Frequency: 2-3 times a week     Drinks per session: 1 or 2     Binge frequency: Never     Comment: wine with dinner 3 times a week; 2-4 cocktails per week     Alcohol Use 10/18/2019   Prescreen: >3 drinks/day or >7  "drinks/week? No   Prescreen: >3 drinks/day or >7 drinks/week? -       Reviewed orders with patient.  Reviewed health maintenance and updated orders accordingly - Yes  Lab work is in process    Mammogram Screening: Patient over age 50, mutual decision to screen reflected in health maintenance.    Pertinent mammograms are reviewed under the imaging tab.  History of abnormal Pap smear: NO - age 30-65 PAP every 5 years with negative HPV co-testing recommended  PAP / HPV 1/18/2006   PAP NIL     Reviewed and updated as needed this visit by clinical staff  Tobacco  Allergies  Med Hx  Surg Hx  Fam Hx  Soc Hx        Reviewed and updated as needed this visit by Provider            Review of Systems   Constitutional: Negative for chills and fever.   HENT: Negative for congestion, ear pain, hearing loss and sore throat.    Eyes: Negative for pain and visual disturbance.   Respiratory: Negative for cough and shortness of breath.    Cardiovascular: Negative for chest pain, palpitations and peripheral edema.   Gastrointestinal: Negative for abdominal pain, constipation, diarrhea, heartburn, hematochezia and nausea.   Breasts:  Negative for tenderness, breast mass and discharge.   Genitourinary: Negative for dysuria, frequency, genital sores, hematuria, pelvic pain, urgency, vaginal bleeding and vaginal discharge.   Musculoskeletal: Negative for arthralgias, joint swelling and myalgias.   Skin: Negative for rash.   Neurological: Negative for dizziness, weakness, headaches and paresthesias.   Psychiatric/Behavioral: Negative for mood changes. The patient is not nervous/anxious.         OBJECTIVE:   BP (!) 162/88   Pulse 75   Temp 97.9  F (36.6  C) (Oral)   Resp 16   Ht 1.651 m (5' 5\")   Wt 88.4 kg (194 lb 14.4 oz)   LMP 01/07/2006   SpO2 99%   BMI 32.43 kg/m    Physical Exam  GENERAL: healthy, alert and no distress  EYES: Eyes grossly normal to inspection, PERRL and conjunctivae and sclerae normal  HENT: ear canals and " "TM's normal, nose and mouth without ulcers or lesions  NECK: no adenopathy, no asymmetry, masses, or scars and thyroid normal to palpation  RESP: lungs clear to auscultation - no rales, rhonchi or wheezes  CV: regular rate and rhythm, normal S1 S2, no S3 or S4, no murmur, click or rub, no peripheral edema and peripheral pulses strong  MS: no gross musculoskeletal defects noted, no edema  PSYCH: mentation appears normal, affect normal/bright        ASSESSMENT/PLAN:   1. Routine general medical examination at a health care facility      2. Benign essential hypertension  Not well controlled. We reviewed notes over time of meds she has tried, which has been extensive. At last conversation with PCP, had though BB would be next logical choice. We reviewed this medication I depth including side effects, risks, benefits. She will start and schedule BP check in 1-2 wks. She will start checking BPs at home as well.   - metoprolol succinate ER (TOPROL-XL) 25 MG 24 hr tablet; Take 1 tablet (25 mg) by mouth daily  Dispense: 90 tablet; Refill: 0  - Basic metabolic panel    3. Visit for screening mammogram    - *MA Screening Digital Bilateral; Future    4. Screening for colon cancer    - GASTROENTEROLOGY ADULT REF PROCEDURE ONLY None    5. Need for influenza vaccination    - C RIV4 (FLUBLOK) VACCINE RECOMBINANT DNA PRSRV ANTIBIO FREE, IM [[id number]]    6. Vitamin D deficiency    - Vitamin D Deficiency    COUNSELING:  Reviewed preventive health counseling, as reflected in patient instructions  Special attention given to:        Regular exercise       Healthy diet/nutrition       Osteoporosis Prevention/Bone Health       ([place])menopause management    Estimated body mass index is 32.43 kg/m  as calculated from the following:    Height as of this encounter: 1.651 m (5' 5\").    Weight as of this encounter: 88.4 kg (194 lb 14.4 oz).    Weight management plan: she exercises a lot (swimming 3 days per wk and daily walking), cooks organic, " home cooked meals with portion control     reports that she has never smoked. She has never used smokeless tobacco.      Counseling Resources:  ATP IV Guidelines  Pooled Cohorts Equation Calculator  Breast Cancer Risk Calculator  FRAX Risk Assessment  ICSI Preventive Guidelines  Dietary Guidelines for Americans, 2010  USDA's MyPlate  ASA Prophylaxis  Lung CA Screening    YESSI Dorantes [facility]

## 2019-10-22 LAB
ANION GAP SERPL CALCULATED.3IONS-SCNC: 9 MMOL/L (ref 3–14)
BUN SERPL-MCNC: 14 MG/DL (ref 7–30)
CALCIUM SERPL-MCNC: 8.7 MG/DL (ref 8.5–10.1)
CHLORIDE SERPL-SCNC: 109 MMOL/L (ref 94–109)
CO2 SERPL-SCNC: 22 MMOL/L (ref 20–32)
CREAT SERPL-MCNC: 0.63 MG/DL (ref 0.52–1.04)
DEPRECATED CALCIDIOL+CALCIFEROL SERPL-MC: 66 UG/L (ref 20–75)
GFR SERPL CREATININE-BSD FRML MDRD: >90 ML/MIN/{1.73_M2}
GLUCOSE SERPL-MCNC: 72 MG/DL (ref 70–99)
POTASSIUM SERPL-SCNC: 4.3 MMOL/L (ref 3.4–5.3)
SODIUM SERPL-SCNC: 140 MMOL/L (ref 133–144)

## 2019-10-22 RX ORDER — ZOSTER VACCINE RECOMBINANT, ADJUVANTED 50 MCG/0.5
KIT INTRAMUSCULAR
Refills: 0 | COMMUNITY
Start: 2018-12-26 | End: 2020-06-22

## 2019-10-30 ENCOUNTER — HEALTH MAINTENANCE LETTER (OUTPATIENT)
Age: 59
End: 2019-10-30

## 2019-11-04 ENCOUNTER — ALLIED HEALTH/NURSE VISIT (OUTPATIENT)
Dept: NURSING | Facility: CLINIC | Age: 59
End: 2019-11-04
Payer: COMMERCIAL

## 2019-11-04 VITALS
RESPIRATION RATE: 16 BRPM | DIASTOLIC BLOOD PRESSURE: 96 MMHG | OXYGEN SATURATION: 96 % | HEART RATE: 76 BPM | SYSTOLIC BLOOD PRESSURE: 162 MMHG

## 2019-11-04 DIAGNOSIS — I10 BENIGN ESSENTIAL HYPERTENSION: Primary | ICD-10-CM

## 2019-11-04 PROCEDURE — 99207 ZZC NO CHARGE NURSE ONLY: CPT

## 2019-11-04 RX ORDER — METOPROLOL SUCCINATE 50 MG/1
50 TABLET, EXTENDED RELEASE ORAL DAILY
Qty: 30 TABLET | Refills: 0 | Status: SHIPPED | OUTPATIENT
Start: 2019-11-04 | End: 2019-11-20

## 2019-11-04 NOTE — PROGRESS NOTES
Damion Stanley is a 58 year old patient who comes in today for a Blood Pressure check.  Initial BP:  BP (!) 162/96 (BP Location: Right arm, Patient Position: Sitting, Cuff Size: Adult Regular)   Pulse 76   Resp 16   LMP 01/07/2006   SpO2 96%      76  Disposition: provider notified while patient in the clinic. Spoke with  Rachael Dixon prior to patient leaving the clinic.       Adriane Louis, CMA

## 2019-11-11 ENCOUNTER — TELEPHONE (OUTPATIENT)
Dept: PEDIATRICS | Facility: CLINIC | Age: 59
End: 2019-11-11

## 2019-11-11 ENCOUNTER — ALLIED HEALTH/NURSE VISIT (OUTPATIENT)
Dept: NURSING | Facility: CLINIC | Age: 59
End: 2019-11-11
Payer: COMMERCIAL

## 2019-11-11 VITALS — SYSTOLIC BLOOD PRESSURE: 152 MMHG | DIASTOLIC BLOOD PRESSURE: 98 MMHG

## 2019-11-11 DIAGNOSIS — Z01.30 BLOOD PRESSURE CHECK: Primary | ICD-10-CM

## 2019-11-11 PROCEDURE — 99207 ZZC NO CHARGE NURSE ONLY: CPT

## 2019-11-11 NOTE — TELEPHONE ENCOUNTER
Panel Management Review          Composite cancer screening  Chart review shows that this patient is due/due soon for the following Colonoscopy  Summary:    Patient is due/failing the following:   COLONOSCOPY    Action needed:   Patient needs referral/order: colonoscopy    Type of outreach:    Patient is scheduled for 11-13-19, defer for now.    Questions for provider review:    None                                                                                                                                    Sophia Dietrich CMA([facility])

## 2019-11-11 NOTE — PROGRESS NOTES
Damion Stanley is a 58 year old patient who comes in today for a Blood Pressure check.  Initial BP  146/98, 152/98, 154/98        Data Unavailable  Disposition: provider notified while patient in the clinic

## 2019-11-11 NOTE — PROGRESS NOTES
BP Readings from Last 3 Encounters:   11/11/19 (!) 152/98   11/04/19 (!) 162/96   10/21/19 (!) 162/88     Data Unavailable  Vitals: BP (!) 152/98   LMP 01/07/2006   BMI= There is no height or weight on file to calculate BMI.    She should have hda a pulse done today.     Please verify that she has been taking metoprolol XL 50 mg. If so, please have her increase dose to 75 mg daily (ask her her home BP readings and pulse and document please) and have her return to clinic for nurse nly BP check in 1-2 wks.

## 2019-11-11 NOTE — PROGRESS NOTES
Spoke with patient, she is taking Metoprolol 50mg so she will increase to 75mg daily (has 25mg and 50mg at home), will bring in home BP and pulse readings at nurse only on 11-18-19.

## 2019-11-13 ENCOUNTER — HOSPITAL ENCOUNTER (OUTPATIENT)
Facility: CLINIC | Age: 59
Discharge: HOME | End: 2019-11-13
Attending: INTERNAL MEDICINE | Admitting: INTERNAL MEDICINE
Payer: COMMERCIAL

## 2019-11-13 VITALS
WEIGHT: 194 LBS | OXYGEN SATURATION: 93 % | HEIGHT: 65 IN | SYSTOLIC BLOOD PRESSURE: 120 MMHG | BODY MASS INDEX: 32.32 KG/M2 | DIASTOLIC BLOOD PRESSURE: 81 MMHG | HEART RATE: 72 BPM | RESPIRATION RATE: 16 BRPM

## 2019-11-13 LAB — COLONOSCOPY: NORMAL

## 2019-11-13 PROCEDURE — 25000128 H RX IP 250 OP 636: Performed by: INTERNAL MEDICINE

## 2019-11-13 PROCEDURE — G0121 COLON CA SCRN NOT HI RSK IND: HCPCS | Performed by: INTERNAL MEDICINE

## 2019-11-13 PROCEDURE — 45378 DIAGNOSTIC COLONOSCOPY: CPT | Performed by: INTERNAL MEDICINE

## 2019-11-13 PROCEDURE — G0500 MOD SEDAT ENDO SERVICE >5YRS: HCPCS | Performed by: INTERNAL MEDICINE

## 2019-11-13 RX ORDER — ONDANSETRON 2 MG/ML
4 INJECTION INTRAMUSCULAR; INTRAVENOUS EVERY 6 HOURS PRN
Status: DISCONTINUED | OUTPATIENT
Start: 2019-11-13 | End: 2019-11-13 | Stop reason: HOSPADM

## 2019-11-13 RX ORDER — LIDOCAINE 40 MG/G
CREAM TOPICAL
Status: DISCONTINUED | OUTPATIENT
Start: 2019-11-13 | End: 2019-11-13 | Stop reason: HOSPADM

## 2019-11-13 RX ORDER — ONDANSETRON 4 MG/1
4 TABLET, ORALLY DISINTEGRATING ORAL EVERY 6 HOURS PRN
Status: DISCONTINUED | OUTPATIENT
Start: 2019-11-13 | End: 2019-11-13 | Stop reason: HOSPADM

## 2019-11-13 RX ORDER — FLUMAZENIL 0.1 MG/ML
0.2 INJECTION, SOLUTION INTRAVENOUS
Status: DISCONTINUED | OUTPATIENT
Start: 2019-11-13 | End: 2019-11-13 | Stop reason: HOSPADM

## 2019-11-13 RX ORDER — NALOXONE HYDROCHLORIDE 0.4 MG/ML
.1-.4 INJECTION, SOLUTION INTRAMUSCULAR; INTRAVENOUS; SUBCUTANEOUS
Status: DISCONTINUED | OUTPATIENT
Start: 2019-11-13 | End: 2019-11-13 | Stop reason: HOSPADM

## 2019-11-13 RX ORDER — ONDANSETRON 2 MG/ML
4 INJECTION INTRAMUSCULAR; INTRAVENOUS
Status: DISCONTINUED | OUTPATIENT
Start: 2019-11-13 | End: 2019-11-13 | Stop reason: HOSPADM

## 2019-11-13 RX ORDER — FENTANYL CITRATE 50 UG/ML
INJECTION, SOLUTION INTRAMUSCULAR; INTRAVENOUS PRN
Status: DISCONTINUED | OUTPATIENT
Start: 2019-11-13 | End: 2019-11-13 | Stop reason: HOSPADM

## 2019-11-13 ASSESSMENT — MIFFLIN-ST. JEOR: SCORE: 1450.86

## 2019-11-13 NOTE — LETTER
October 22, 2019      Damion Stanley  [address] [place] [address]        Dear Damion,         Thank you for choosing [facility]. You are scheduled for the following service(s).   Please be aware that coverage of these services is subject to the terms and limitations of your health insurance plan.  Call member services at your health plan with any benefit or coverage questions.    Date:  11-13-19             Procedure:  COLONOSCOPY  Doctor:        Beth   Arrival Time:  1000  *Check in at Emergency/Endoscopy desk*  Procedure Time:  1030      Location:   [facility]        Endoscopy Department, First Floor (Enter through ER Doors) *        [address] [place], [place] [address] [phone] or [phone] ([facility]) to reschedule      MIRALAX -GATORADE  PREP  Colonoscopy is the most accurate test to detect colon polyps and colon cancer; and the only test where polyps can be removed. During this procedure, a doctor examines the lining of your large intestine and rectum through a flexible tube.   Transportation  You must arrange for a ride for the day of your procedure with a responsible adult. A taxi , Uber, etc, is not an option unless you are accompanied by a responsible adult. If you fail to arrange transportation with a responsible adult, your procedure will be cancelled and rescheduled.    Purchase the  following supplies at your local pharmacy:  - 2 (two) bisacodyl tablets: each tablet contains 5 mg.  (Dulcolax  laxative NOT Dulcolax  stool softener)   - 1 (one) 8.3 oz bottle of Polyethylene Glycol (PEG) 3350 Powder   (MiraLAX , Smooth LAX , ClearLAX  or equivalent)  - 64 oz Gatorade    Regular Gatorade, Gatorade G2 , Powerade , Powerade Zero  or Pedialyte  is acceptable. Red colored flavors are not allowed; all other colors (yellow, green, orange, purple and blue) are okay. It is also okay to buy two 2.12 oz packets of powdered  Gatorade that can be mixed with water to a total volume of 64 oz of liquid.  - 1 (one) 10 oz bottle of Magnesium Citrate (Red colored flavors are not allowed)  It is also okay for you to use a 0.5 oz package of powdered magnesium citrate (17 g) mixed with 10 oz of water.      PREPARATION FOR COLONOSCOPY    7 days before:    Discontinue fiber supplements and medications containing iron. This includes Metamucil  and Fibercon ; and multivitamins with iron.    3 days before:    Begin a low-fiber diet. A low-fiber diet helps making the cleanout more effective.     Examples of a low-fiber diet include (but are not limited to): white bread, white rice, pasta, crackers, fish, chicken, eggs, ground beef, creamy peanut butter, cooked/steamed/boiled vegetables, canned fruit, bananas, melons, milk, plain yogurt cheese, salad dressing and other condiments.     The following are not allowed on a low-fiber diet: seeds, nuts, popcorn, bran, whole wheat, corn, quinoa, raw fruits and vegetables, berries and dried fruit, beans and lentils.    For additional details on low-fiber diet, please refer to the table on the last page.    2 days before:    Continue the low-fiber diet.     Drink at least 8 glasses of water throughout the day.     Stop eating solid foods at 11:45 pm.    1 day before:    In the morning: begin a clear liquid diet (liquids you can see through).     Examples of a clear liquid diet include: water, clear broth or bouillon, Gatorade, Pedialyte or Powerade, carbonated and non-carbonated soft drinks (Sprite , 7-Up , ginger ale), strained fruit juices without pulp (apple, white grape, white cranberry), Jell-O  and popsicles.     The following are not allowed on a clear liquid diet: red liquids, alcoholic beverages, dairy products (milk, creamer, and yogurt), protein shakes, creamy broths, juice with pulp and chewing tobacco.    At noon: take 2 (two) bisacodyl tablets     At 4 (and no later than 6pm): start drinking the  Miralax-Gatorade preparation (8.3 oz of Miralax mixed with 64 oz of Gatorade in a large pitcher). Drink 1(one) 8 oz glass every 15 minutes thereafter, until the mixture is gone.    COLON CLEANSING TIPS: drink adequate amounts of fluids before and after your colon cleansing to prevent dehydration. Stay near a toilet because you will have diarrhea. Even if you are sitting on the toilet, continue to drink the cleansing solution every 15 minutes. If you feel nauseous or vomit, rinse your mouth with water, take a 15 to 30-minute-break and then continue drinking the solution. You will be uncomfortable until the stool has flushed from your colon (in about 2 to 4 hours). You may feel chilled.    Day of your procedure  You may take all of your morning medications including blood pressure medications, blood thinners (if you have not been instructed to stop these by our office), methadone, anti-seizure medications with sips of water 3 hours prior to your procedure or earlier. Do not take insulin or vitamins prior to your procedure. Continue the clear liquid diet.       4 hours prior: drink 10 oz of magnesium citrate. It may be easier to drink it with a straw.    STOP consuming all liquids after that.     Do not take anything by mouth during this time.     Allow extra time to travel to your procedure as you may need to stop and use a restroom along the way.    You are ready for the procedure, if you followed all instructions and your stool is no longer formed, but clear or yellow liquid. If you are unsure whether your colon is clean, please call our office at [phone] before you leave for your appointment.    Bring the following to your procedure:  - Insurance Card/Photo ID.   - List of current medications including over-the-counter medications and supplements.   - Your rescue inhaler if you currently use one to control asthma.      Canceling or rescheduling your appointment:   If you must cancel or reschedule your  appointment, please call [phone] as soon as possible.      COLONOSCOPY PRE-PROCEDURE CHECKLIST    If you have diabetes, ask your regular doctor for diet and medication restrictions.  If you take an anticoagulant or anti-platelet medication (such as Coumadin , Lovenox , Pradaxa , Xarelto , Eliquis , etc.), please call your primary doctor for advice on holding this medication.  If you take aspirin you may continue to do so.  If you are or may be pregnant, please discuss the risks and benefits of this procedure with your doctor.        What happens during a colonoscopy?    Plan to spend up to two hours, starting at registration time, at the endoscopy center the day of your procedure. The colonoscopy takes an average of 15 to 30 minutes. Recovery time is about 30 minutes.      Before the exam:    You will change into a gown.    Your medical history and medication list will be reviewed with you, unless that has been done over the phone prior to the procedure.     A nurse will insert an intravenous (IV) line into your hand or arm.    The doctor will meet with you and will give you a consent form to sign.  During the exam:     Medicine will be given through the IV line to help you relax.     Your heart rate and oxygen levels will be monitored. If your blood pressure is low, you may be given fluids through the IV line.     The doctor will insert a flexible hollow tube, called a colonoscope, into your rectum. The scope will be advanced slowly through the large intestine (colon).    You may have a feeling of fullness or pressure.     If an abnormal tissue or a polyp is found, the doctor may remove it through the endoscope for closer examination, or biopsy. Tissue removal is painless    After the exam:           Any tissue samples removed during the exam will be sent to a lab for evaluation. It may take 5-7 working days for you to be notified of the results.     A nurse will provide you with complete discharge  instructions before you leave the endoscopy center. Be sure to ask the nurse for specific instructions if you take blood thinners such as Aspirin, Coumadin or Plavix.     The doctor will prepare a full report for you and for the physician who referred you for the procedure.     Your doctor will talk with you about the initial results of your exam.      Medication given during the exam will prohibit you from driving for the rest of the day.     Following the exam, you may resume your normal diet. Your first meal should be light, no greasy foods. Avoid alcohol until the next day.     You may resume your regular activities the day after the procedure.         LOW-FIBER DIET    Foods RECOMMENDED Foods to AVOID   Breads, Cereal, Rice and Pasta:   White bread, rolls, biscuits, croissant and janett toast.   Waffles, Setswana toast and pancakes.   White rice, noodles, pasta, macaroni and peeled cooked potatoes.   Plain crackers and saltines.   Cooked cereals: farina, cream of rice.   Cold cereals: Puffed Rice , Rice Krispies , Corn Flakes  and Special K    Breads, Cereal, Rice and Pasta:   Breads or rolls with nuts, seeds or fruit.   Whole wheat, pumpernickel, rye breads and cornbread.   Potatoes with skin, brown or wild rice, and kasha (buckwheat).     Vegetables:   Tender cooked and canned vegetables without seeds: carrots, asparagus tips, green or wax beans, pumpkin, spinach, lima beans. Vegetables:   Raw or steamed vegetables.   Vegetables with seeds.   Sauerkraut.   Winter squash, peas, broccoli, Brussel sprouts, cabbage, onions, cauliflower, baked beans, peas and corn.   Fruits:   Strained fruit juice.   Canned fruit, except pineapple.   Ripe bananas and melon. Fruits:   Prunes and prune juice.   Raw fruits.   Dried fruits: figs, dates and raisins.   Milk/Dairy:   Milk: plain or flavored.   Yogurt, custard and ice cream.   Cheese and cottage cheese Milk/Dairy:     Meat and other proteins:   ground, well-cooked tender  beef, lamb, ham, veal, pork, fish, poultry and organ meats.   Eggs.   Peanut butter without nuts. Meat and other proteins:   Tough, fibrous meats with gristle.   Dry beans, peas and lentils.   Peanut butter with nuts.   Tofu.   Fats, Snack, Sweets, Condiments and Beverages:   Margarine, butter, oils, mayonnaise, sour cream and salad dressing, plain gravy.   Sugar, hard candy, clear jelly, honey and syrup.   Spices, cooked herbs, bouillon, broth and soups made with allowed vegetable, ketchup and mustard.   Coffee, tea and carbonated drinks.   Plain cakes, cookies and pretzels.   Gelatin, plain puddings, custard, ice cream, sherbet and popsicles. Fats, Snack, Sweets, Condiments and Beverages:   Nuts, seeds and coconut.   Jam, marmalade and preserves.   Pickles, olives, relish and horseradish.   All desserts containing nuts, seeds, dried fruit and coconut; or made from whole grains or bran.   Candy made with nuts or seeds.   Popcorn.                     DIRECTIONS TO THE ENDOSCOPY DEPARTMENT     From the north ([facility])  Take [address] South, exit on [address] [address]. Get into the left hand [address], turn left (east), go one-half mile to [address] and turn left. Go north to the first stoplight, take a right on [address] and follow it to the Emergency entrance.    From the south ([facility])  Take 35N to the 35E split and exit on [address] [address]. On [address] [address], turn left (west) to [address]. Turn right (north) on [address]. Go north to the first stoplight, take a right on [address] and follow it to the Emergency entrance.    From the east via 35E ([facility])  Take [address] south to [address] [address] exit. Turn right on [address] [address]. Go west to [address]. Turn right (north) on [address]. Go to the first stoplight, take a right and follow on [address] to the Emergency entrance.    From the east via [address] ([facility])  [address]  to [address]. Turn left (south) on [address] to [address]. Turn left (east) on [address] and follow it to the Emergency entrance.    From the west via [address] ([place], [place])  Take [address] east to [address]. Turn right (south) on [address] to [address]. Turn left (east) on [address] and follow it to the Emergency entrance.

## 2019-11-13 NOTE — H&P
Pre-Endoscopy History and Physical     Damion Stanley MRN# [id number]   YOB: 1960 Age: 58 year old     Date of Procedure: 11/13/2019  Primary care provider: Anushka Moctezuma  Type of Endoscopy: Colonoscopy with possible biopsy, possible polypectomy  Reason for Procedure: screen  Type of Anesthesia Anticipated: Conscious Sedation    HPI:    Damion is a 58 year old female who will be undergoing the above procedure.      A history and physical has been performed. The patient's medications and allergies have been reviewed. The risks and benefits of the procedure and the sedation options and risks were discussed with the patient.  All questions were answered and informed consent was obtained.      She denies a personal or family history of anesthesia complications or bleeding disorders.     Patient Active Problem List   Diagnosis     Pain in joint, upper arm     Headache     CARDIOVASCULAR SCREENING; LDL GOAL LESS THAN 160     Menopause     Vitamin D deficiency     Impetigo     Senile sebaceous gland hyperplasia     Dermal nevus     Class 1 obesity due to excess calories without serious comorbidity with body mass index (BMI) of 31.0 to 31.9 in adult     Benign essential hypertension        Past Medical History:   Diagnosis Date     Cancer (H)     Mother, breast cancer; Father, lymphoma     Diabetes (H)     Father, Type 2     Heart disease     Father, grandfather     Hypertension     Father     Plantar fascial fibromatosis      S/P hysterectomy 2006    ovaries remain     Thyroid disease     Father        Past Surgical History:   Procedure Laterality Date     ABDOMEN SURGERY  2008    Hysterectomy     COLONOSCOPY  2009     HC TOOTH EXTRACTION W/FORCEP       HYSTERECTOMY, PAP NO LONGER INDICATED       HYSTERECTOMY, SOLEDAD  3/2006    ovaries intact       Social History     Tobacco Use     Smoking status: Never Smoker     Smokeless tobacco: Never Used   Substance Use Topics     Alcohol use: Yes     Alcohol/week:  "0.0 standard drinks     Frequency: 2-3 times a week     Drinks per session: 1 or 2     Binge frequency: Never     Comment: wine with dinner 3 times a week; 2-4 cocktails per week       Family History   Problem Relation Age of Onset     Diabetes Father         Type 2     Hypertension Father      Cancer Father         mantle cell lymphoma     Hyperlipidemia Father      Other Cancer Father      Breast Cancer Mother         Age 54     C.A.D. Paternal Grandfather         Age 60     Cancer Maternal Grandfather         Lung-smoker     Neurologic Disorder Maternal Grandmother         MS     Colon Cancer No family hx of        Prior to Admission medications    Medication Sig Start Date End Date Taking? Authorizing Provider   metoprolol succinate ER (TOPROL-XL) 25 MG 24 hr tablet Take 1 tablet (25 mg) by mouth daily 10/21/19  Yes Carlee Ackerman APRN CNP   metoprolol succinate ER (TOPROL-XL) 50 MG 24 hr tablet Take 1 tablet (50 mg) by mouth daily 11/4/19  Yes Carlee Ackerman APRN CNP   calcium (CALCIUM 600) 600 MG tablet Take 1 tablet by mouth. Take one tablet daily with Breakfast     Reported, Patient   Cholecalciferol (VITAMIN D-3) 5000 UNITS TABS Take 1 tablet by mouth. One tablet daily     Reported, Patient   Multiple Vitamin (MULTI-VITAMIN DAILY PO) Take  by mouth.    Reported, Patient   order for DME Equipment being ordered: crutches 3/28/19   France Boykin PA-C   SHINGRIX injection ADM 0.5ML IM UTD 12/26/18   Reported, Patient       Allergies   Allergen Reactions     Hydrochlorothiazide      Sweating, insomnia     Lisinopril Cough     Penicillins      rash     Tylenol W/Codeine [Acetaminophen-Codeine]      Stomach Upset     Losartan Palpitations and Rash        REVIEW OF SYSTEMS:   5 point ROS negative except as noted above in HPI, including Gen., Resp., CV, GI &  system review.    PHYSICAL EXAM:   BP (!) 162/110   Resp 16   Ht 1.651 m (5' 5\")   Wt 88 kg (194 lb)   LMP 01/07/2006   " "SpO2 95%   BMI 32.28 kg/m   Estimated body mass index is 32.28 kg/m  as calculated from the following:    Height as of this encounter: 1.651 m (5' 5\").    Weight as of this encounter: 88 kg (194 lb).   GENERAL APPEARANCE: alert, and oriented  MENTAL STATUS: alert  AIRWAY EXAM: Mallampatti Class I (visualization of the soft palate, fauces, uvula, anterior and posterior pillars)  RESP: lungs clear to auscultation - no rales, rhonchi or wheezes  CV: regular rates and rhythm  DIAGNOSTICS:    Not indicated    IMPRESSION   ASA Class 1 - Healthy patient, no medical problems    PLAN:   Plan for Colonoscopy with possible biopsy, possible polypectomy. We discussed the risks, benefits and alternatives and the patient wished to proceed.    The above has been forwarded to the consulting provider.      Signed Electronically by: Chuy Villanueva MD  November 13, 2019          "

## 2019-11-18 ENCOUNTER — ALLIED HEALTH/NURSE VISIT (OUTPATIENT)
Dept: NURSING | Facility: CLINIC | Age: 59
End: 2019-11-18
Payer: COMMERCIAL

## 2019-11-18 VITALS — OXYGEN SATURATION: 100 % | DIASTOLIC BLOOD PRESSURE: 84 MMHG | SYSTOLIC BLOOD PRESSURE: 148 MMHG | HEART RATE: 57 BPM

## 2019-11-18 DIAGNOSIS — Z01.30 BLOOD PRESSURE CHECK: Primary | ICD-10-CM

## 2019-11-18 PROCEDURE — 99207 ZZC NO CHARGE NURSE ONLY: CPT

## 2019-11-18 NOTE — PROGRESS NOTES
Damion Stanley is a 58 year old patient who comes in today for a Blood Pressure check.  Initial BP:  BP (!) 154/72 (BP Location: Right arm, Patient Position: Chair, Cuff Size: Adult Large)   Pulse 60   LMP 01/07/2006   SpO2 100%      Disposition: results routed to provider    1st attempt with home machine while in clinic- 170/103    Home blood pressure/pulse:   11/11-  BP:166/91    Pulse: 69   11/12-  BP:160/106  Pulse: 68  11/13-  BP: 168/100 Pulse: 76  11/14-  BP: 158/100 Pulse: 59  11/15-  BP: 153/94   Pulse: 59  11/16-  BP: 146/112 Pulse: 76  11/17-  BP: 147/88   Pulse: 79  11/18-  BP: 151/91   Pulse: 64     Patient wants to know what to do with her blood pressure medication. She is concerned about how high her blood pressures have been lately.         Latricia Burns MA

## 2019-11-18 NOTE — PROGRESS NOTES
Provider reviewed visit. Dr. Moctezuma recommended that patient schedule a phone visit or e-visit to start additional medication. Heart rate is to low to increase metoprolol.   Patient stated that she will look at [facility] to schedule a visit with Dr. Moctezuma.     Latricia Burns MA

## 2019-11-20 ENCOUNTER — VIRTUAL VISIT (OUTPATIENT)
Dept: PEDIATRICS | Facility: CLINIC | Age: 59
End: 2019-11-20
Payer: COMMERCIAL

## 2019-11-20 DIAGNOSIS — I10 BENIGN ESSENTIAL HYPERTENSION: ICD-10-CM

## 2019-11-20 PROCEDURE — 99441 ZZC PHYSICIAN TELEPHONE EVALUATION 5-10 MIN: CPT | Performed by: PEDIATRICS

## 2019-11-20 RX ORDER — METOPROLOL SUCCINATE 50 MG/1
50 TABLET, EXTENDED RELEASE ORAL DAILY
Qty: 90 TABLET | Refills: 3 | Status: SHIPPED | OUTPATIENT
Start: 2019-11-20

## 2019-11-20 RX ORDER — METOPROLOL SUCCINATE 25 MG/1
25 TABLET, EXTENDED RELEASE ORAL DAILY
Qty: 90 TABLET | Refills: 3 | Status: SHIPPED | OUTPATIENT
Start: 2019-11-20 | End: 2020-07-10

## 2019-11-20 RX ORDER — SPIRONOLACTONE 25 MG/1
25 TABLET ORAL DAILY
Qty: 90 TABLET | Refills: 0 | Status: SHIPPED | OUTPATIENT
Start: 2019-11-20 | End: 2020-02-10

## 2019-11-20 NOTE — PROGRESS NOTES
"Damion Stanley is a 58 year old female who is being evaluated via a telephone visit.      The patient has been notified of following (by M.A) Aleena Matthews MA       \"We have found that certain health care needs can be provided without the need for a physical exam.  This service lets us provide the care you need with a short phone conversation.  If a prescription is necessary we can send it directly to your pharmacy.  If lab work is needed we can place an order for that and you can then stop by our lab to have the test done at a later time.    This telephone visit will be conducted via 3 way call with the you (the patient) , the physician/provider, and a me all on the line at the same time.  This allows your physician/provider to have the phone conversation with you while I will be taking notes for your medical record.  We will have full access to your [place] medical record during this entire phone call.    Since this is like an office visit,  will bill your insurance company for this service.  Please check with your medical insurance if this type of telephone/virtual is covered . You may be responsible for the cost of this service if insurance coverage is denied.  The typical cost is $30 (10min), $59(11-20min) and $85 (21-30min)     If during the course of the call the physician/provider feels a telephone visit is not appropriate, you will not be charged for this service\"    Consent has been obtained for this service by care team member: yes.  See the scanned image in the medical record.    S: The history as provided by the patient to the provider during this 3 way call include :    Patient with uncontrolled hypertension.     BP Readings from Last 6 Encounters:   11/18/19 (!) 148/84   11/13/19 120/81   11/11/19 (!) 152/98   11/04/19 (!) 162/96   10/21/19 (!) 162/88   03/28/19 (!) 148/94     Patient currently taking metoprolol 75 mg, but HR ~60.  Denies chest pain or shortness of breath.  She has not " tolerated lisniopril, losartan HYDROCHLOROTHIAZIDE or amlodipine.  She has never been on sprionolactone.     Pertinent parts of the the patient's medical history reviewed and confirmed by the provider included : as above     Total time of call between patient and provider was 7 minutes     Anushka Moctezuma MD  Internal Medicine/Pediatrics  [facility]     (MD signature)  ===================================================    I have reviewed the note as documented above.  This accurately captures the substance of my conversation with the patient,    Additional provider notes:as above    Assessment/Plan:  Hypertension - continue metoprolol 75mg daily AND add spironolactone 25mg daily.    -needs lab only and nurse only follow up in 2 weeks.    -plan to to titrate up dose as tolerated.    Anushka Moctezuma MD  Internal Medicine/Pediatrics  [facility]

## 2019-11-21 ENCOUNTER — ANCILLARY PROCEDURE (OUTPATIENT)
Dept: MAMMOGRAPHY | Facility: CLINIC | Age: 59
End: 2019-11-21
Attending: NURSE PRACTITIONER
Payer: COMMERCIAL

## 2019-11-21 DIAGNOSIS — Z12.31 VISIT FOR SCREENING MAMMOGRAM: ICD-10-CM

## 2019-11-21 PROCEDURE — 77063 BREAST TOMOSYNTHESIS BI: CPT | Mod: TC

## 2019-11-21 PROCEDURE — 77067 SCR MAMMO BI INCL CAD: CPT | Mod: TC

## 2019-12-09 ENCOUNTER — ALLIED HEALTH/NURSE VISIT (OUTPATIENT)
Dept: NURSING | Facility: CLINIC | Age: 59
End: 2019-12-09
Payer: COMMERCIAL

## 2019-12-09 VITALS — SYSTOLIC BLOOD PRESSURE: 138 MMHG | DIASTOLIC BLOOD PRESSURE: 90 MMHG

## 2019-12-09 DIAGNOSIS — Z01.30 BP CHECK: Primary | ICD-10-CM

## 2019-12-09 DIAGNOSIS — I10 BENIGN ESSENTIAL HYPERTENSION: ICD-10-CM

## 2019-12-09 LAB
ANION GAP SERPL CALCULATED.3IONS-SCNC: 7 MMOL/L (ref 3–14)
BUN SERPL-MCNC: 15 MG/DL (ref 7–30)
CALCIUM SERPL-MCNC: 9.4 MG/DL (ref 8.5–10.1)
CHLORIDE SERPL-SCNC: 108 MMOL/L (ref 94–109)
CO2 SERPL-SCNC: 23 MMOL/L (ref 20–32)
CREAT SERPL-MCNC: 0.66 MG/DL (ref 0.52–1.04)
GFR SERPL CREATININE-BSD FRML MDRD: >90 ML/MIN/{1.73_M2}
GLUCOSE SERPL-MCNC: 85 MG/DL (ref 70–99)
POTASSIUM SERPL-SCNC: 4.7 MMOL/L (ref 3.4–5.3)
SODIUM SERPL-SCNC: 138 MMOL/L (ref 133–144)

## 2019-12-09 PROCEDURE — 80048 BASIC METABOLIC PNL TOTAL CA: CPT | Performed by: PEDIATRICS

## 2019-12-09 PROCEDURE — 36415 COLL VENOUS BLD VENIPUNCTURE: CPT | Performed by: PEDIATRICS

## 2019-12-09 PROCEDURE — 99207 ZZC NO CHARGE NURSE ONLY: CPT

## 2019-12-09 NOTE — PROGRESS NOTES
Damion Stanley is a 58 year old patient who comes in today for a Blood Pressure check.  Initial BP:  BP (!) 138/90 (BP Location: Right arm, Patient Position: Sitting, Cuff Size: Adult Large)   LMP 01/07/2006      Data Unavailable  Disposition: results routed to provider      Pt states home BP readings are ranging between 150-160's / 's. Pt feels BP not getting any better after being on medication. Concerned if she should even continue taking medication. Pt has not stopped medication.     Audra Marcelino MA 10:24 AM 12/9/2019

## 2019-12-10 DIAGNOSIS — I10 BENIGN ESSENTIAL HYPERTENSION: ICD-10-CM

## 2019-12-11 ENCOUNTER — MYC REFILL (OUTPATIENT)
Dept: PEDIATRICS | Facility: CLINIC | Age: 59
End: 2019-12-11

## 2019-12-11 DIAGNOSIS — I10 BENIGN ESSENTIAL HYPERTENSION: ICD-10-CM

## 2019-12-12 RX ORDER — METOPROLOL SUCCINATE 50 MG/1
50 TABLET, EXTENDED RELEASE ORAL DAILY
Qty: 90 TABLET | Refills: 3 | OUTPATIENT
Start: 2019-12-12

## 2019-12-12 RX ORDER — METOPROLOL SUCCINATE 50 MG/1
TABLET, EXTENDED RELEASE ORAL
Qty: 30 TABLET | Refills: 0 | OUTPATIENT
Start: 2019-12-12

## 2019-12-12 RX ORDER — METOPROLOL SUCCINATE 25 MG/1
25 TABLET, EXTENDED RELEASE ORAL DAILY
Qty: 90 TABLET | Refills: 3 | OUTPATIENT
Start: 2019-12-12

## 2020-03-30 ENCOUNTER — VIRTUAL VISIT (OUTPATIENT)
Dept: PEDIATRICS | Facility: CLINIC | Age: 60
End: 2020-03-30
Payer: COMMERCIAL

## 2020-03-30 DIAGNOSIS — I10 BENIGN ESSENTIAL HYPERTENSION: Primary | ICD-10-CM

## 2020-03-30 PROCEDURE — 99214 OFFICE O/P EST MOD 30 MIN: CPT | Mod: TEL | Performed by: PEDIATRICS

## 2020-03-30 RX ORDER — SPIRONOLACTONE 50 MG/1
50 TABLET, FILM COATED ORAL DAILY
Qty: 90 TABLET | Refills: 0 | Status: SHIPPED | OUTPATIENT
Start: 2020-03-30 | End: 2020-05-27

## 2020-03-30 NOTE — PROGRESS NOTES
"Subjective     Damion Stanley is a 59 year old female who is being evaluated via a billable telephone visit.      The patient has been notified of following:     \"This telephone visit will be conducted via a call between you and your physician/provider. We have found that certain health care needs can be provided without the need for a physical exam.  This service lets us provide the care you need with a short phone conversation.  If a prescription is necessary we can send it directly to your pharmacy.  If lab work is needed we can place an order for that and you can then stop by our lab to have the test done at a later time.    If during the course of the call the physician/provider feels a telephone visit is not appropriate, you will not be charged for this service.\"     Physician has received verbal consent for a Telephone Visit from the patient? Yes    Damion Stanley complains of   Chief Complaint   Patient presents with     Telephone       ALLERGIES  Hydrochlorothiazide; Lisinopril; Penicillins; Tylenol w/codeine [acetaminophen-codeine]; and Losartan    Hypertension Follow-up      Do you check your blood pressure regularly outside of the clinic? Yes     Are you following a low salt diet? Yes    Are your blood pressures ever more than 140 on the top number (systolic) OR more   than 90 on the bottom number (diastolic), for example 140/90? Yes      How many servings of fruits and vegetables do you eat daily?  4 or more    On average, how many sweetened beverages do you drink each day (Examples: soda, juice, sweet tea, etc.  Do NOT count diet or artificially sweetened beverages)?   0    How many days per week do you miss taking your medication? 1    What makes it hard for you to take your medications?  remembering to take    Patient currently taking metoprolol 75 (limited by hear rate - cannot go any zee).  She has not tolerated ACE/ARB and amlodipine in the past.    Start spironolactone recently at 25mg - still " getting following home readings:    154/98  173/104  157/98  165/99    Her home machine only takes one reading.    Tolerating spironolactone ok - a little more tired, but tolerable.  NO HA or CP.              Reviewed and updated as needed this visit by Provider         Review of Systems   See HPI       Objective   Reported vitals:  LMP 01/07/2006    healthy, alert and no distress  Psych: Alert and oriented times 3; coherent speech, normal   rate and volume, able to articulate logical thoughts, able   to abstract reason, no tangential thoughts, no hallucinations   or delusions  Her affect is approrpiate     Diagnostic Test Results:  Labs reviewed in Epic        Assessment/Plan:  1. Benign essential hypertension  Uncontrolled per home readings - titrate up spironolactone to 50mg (max 100mg) - check labs and another phone or evisit in 2 weeks.  - spironolactone (ALDACTONE) 50 MG tablet; Take 1 tablet (50 mg) by mouth daily  Dispense: 90 tablet; Refill: 0  - Basic metabolic panel; Future    No follow-ups on file.      Phone call duration:  6 minutes    Anushka Moctezuma MD

## 2020-04-29 ENCOUNTER — MYC MEDICAL ADVICE (OUTPATIENT)
Dept: PEDIATRICS | Facility: CLINIC | Age: 60
End: 2020-04-29

## 2020-04-29 NOTE — TELEPHONE ENCOUNTER
Have her titrate up spironolactone to 100mg and then schedule video visit with me in 2-4 weeks.    Anushka Moctezuma MD  Internal Medicine/Pediatrics  [facility]

## 2020-04-29 NOTE — TELEPHONE ENCOUNTER
Provider please see MC message from pt regarding no change in his BP.     Note  Hi Dr. Moctezuma.  We had a phone visit on 3/30 regarding my blood pressure and an increased dosage for one of the medications.  I have been taking my blood pressure daily,and there is no change or reduction in the readings.  It is staying in the 150/100 area.  I have been on these meds for 6 months, with no improvement.  Please advise if there is a different medication option      Virtual visit 3/30/20 HTN Notes  Assessment/Plan:  1. Benign essential hypertension  Uncontrolled per home readings - titrate up spironolactone to 50mg (max 100mg) - check labs and another phone or evisit in 2 weeks.  - spironolactone (ALDACTONE) 50 MG tablet; Take 1 tablet (50 mg) by mouth daily  Dispense: 90 tablet; Refill: 0  - Basic metabolic panel; Future    Another virtual visit?      MA/TC: Please see pt's message regarding address change  New home address is [address], [place]  [address]     Thank you  Flores Hill RN on 4/29/2020 at 9:06 AM

## 2020-04-29 NOTE — TELEPHONE ENCOUNTER
Clarified with Dr. Moctezuma that patient is to increase spironolactone to 100 mg.     message sent to patient.    Mendy SINCLAIR RN

## 2020-05-04 ENCOUNTER — TELEPHONE (OUTPATIENT)
Dept: PEDIATRICS | Facility: CLINIC | Age: 60
End: 2020-05-04

## 2020-05-04 NOTE — TELEPHONE ENCOUNTER
Called pt and left messag eto call back the clinic. When and if pt does pls schedule for a video visit or phone week of the 11th

## 2020-05-13 ENCOUNTER — VIRTUAL VISIT (OUTPATIENT)
Dept: PEDIATRICS | Facility: CLINIC | Age: 60
End: 2020-05-13
Payer: COMMERCIAL

## 2020-05-13 DIAGNOSIS — I10 BENIGN ESSENTIAL HYPERTENSION: Primary | ICD-10-CM

## 2020-05-13 PROCEDURE — 99214 OFFICE O/P EST MOD 30 MIN: CPT | Mod: 95 | Performed by: PEDIATRICS

## 2020-05-13 RX ORDER — LISINOPRIL 10 MG/1
10 TABLET ORAL DAILY
Qty: 90 TABLET | Refills: 0 | Status: SHIPPED | OUTPATIENT
Start: 2020-05-13

## 2020-05-13 NOTE — PATIENT INSTRUCTIONS
Adithya Bruno - nice to talk with you today.    We will try the lisinopril again and follow up in about 4 weeks.  Please send in blood pressure readings before your visit like you did today - that was very helpful (thanks!).    Anushka Moctezuma MD  Internal Medicine/Pediatrics  [facility]

## 2020-05-13 NOTE — PROGRESS NOTES
"Damion Stanley is a 59 year old female who is being evaluated via a billable video visit.      The patient has been notified of following:     \"This video visit will be conducted via a call between you and your physician/provider. We have found that certain health care needs can be provided without the need for an in-person physical exam.  This service lets us provide the care you need with a video conversation.  If a prescription is necessary we can send it directly to your pharmacy.  If lab work is needed we can place an order for that and you can then stop by our lab to have the test done at a later time.    Video visits are billed at different rates depending on your insurance coverage.  Please reach out to your insurance provider with any questions.    If during the course of the call the physician/provider feels a video visit is not appropriate, you will not be charged for this service.\"    Patient has given verbal consent for Video visit? Yes    How would you like to obtain your AVS? Everardo    Patient would like the video invitation sent by: Send to e-mail at: [email or web address]    Will anyone else be joining your video visit? No    Subjective     Damion Stanley is a 59 year old female who presents today via video visit for the following health issues:    Patient on cruise in March ([place])     Bought a new condo - supposed to close on 3/21, finally worked out and is currently living in new place.    New grandchild 5 weeks early on April    Patient is bored - retired, cook, bike.    HPI  Pt is needing to go over medications today.     daily blood pressure readings since increasing the Sprironolactone on 3/30 from 25mg to 50mg.  159/113  143/95  143/101  135/98  151/97  145/93  154/101  151/100  168/104  163/109    Overall, not much improvement.  She has not tolerated ACE/ARB or amlodipine in the past.     Had leg cramps from lisinopril - she is wondering about trying again to see if response and then if " "leg cramps are manageable she would be interested in staying on this.        Currently taking metoprolol 75mg and spironolactone 100mg    Video Start Time: 155pm        Reviewed and updated as needed this visit by Provider  Meds         Review of Systems   Constitutional, HEENT, cardiovascular, pulmonary, gi and gu systems are negative, except as otherwise noted.      Objective    LMP 01/07/2006   Estimated body mass index is 32.28 kg/m  as calculated from the following:    Height as of 11/13/19: 1.651 m (5' 5\").    Weight as of 11/13/19: 88 kg (194 lb).  Physical Exam     GENERAL: Healthy, alert and no distress  EYES: Eyes grossly normal to inspection.  No discharge or erythema, or obvious scleral/conjunctival abnormalities.  RESP: No audible wheeze, cough, or visible cyanosis.  No visible retractions or increased work of breathing.    SKIN: Visible skin clear. No significant rash, abnormal pigmentation or lesions.  NEURO: Cranial nerves grossly intact.  Mentation and speech appropriate for age.  PSYCH: Mentation appears normal, affect normal/bright, judgement and insight intact, normal speech and appearance well-groomed.      Diagnostic Test Results:  Labs reviewed in Epic        Assessment & Plan     1. Benign essential hypertension  Uncontrolled - discussed hydralazine, however patient would like to try lisinopril again despite side effects.  Will start with 10mg and plan to increase to 20mg if responding.  Continue other medications for now, but would like to get her off the spironolactone if possible.   - lisinopril (ZESTRIL) 10 MG tablet; Take 1 tablet (10 mg) by mouth daily  Dispense: 90 tablet; Refill: 0        Patient Instructions   Adithya Bruno - nice to talk with you today.    We will try the lisinopril again and follow up in about 4 weeks.  Please send in blood pressure readings before your visit like you did today - that was very helpful (thanks!).    Anushka Moctezuma MD  Internal " Medicine/Pediatrics  [facility]          Return in about 4 weeks (around 6/10/2020) for Virtual visit for blood pressure.    Anushka Moctezuma MD  [facility]      Video-Visit Details    Type of service:  Video Visit    Video End Time:2:10 PM    Originating Location (pt. Location): Home    Distant Location (provider location):  [facility]     Platform used for Video Visit: AmWell    Return in about 4 weeks (around 6/10/2020) for Virtual visit for blood pressure.       Anushka Moctezuma MD

## 2020-05-26 ENCOUNTER — MYC MEDICAL ADVICE (OUTPATIENT)
Dept: PEDIATRICS | Facility: CLINIC | Age: 60
End: 2020-05-26

## 2020-05-26 DIAGNOSIS — I10 BENIGN ESSENTIAL HYPERTENSION: Primary | ICD-10-CM

## 2020-05-26 RX ORDER — SPIRONOLACTONE 100 MG/1
100 TABLET, FILM COATED ORAL DAILY
Status: CANCELLED | OUTPATIENT
Start: 2020-05-26

## 2020-05-26 NOTE — TELEPHONE ENCOUNTER
Please see  message from patient:    Hi Dr. Moctezuma, I have a quick question.  I have had a pretty significant improvement in blood presssure.  I sent a message late last week, which Dr. Diaz responded to.  Since then, my blood pressure readings are 136/100, 131/90,111/78, 119/81.     Since I increased the Spironolactone several weeks ago, I now have an 8 day supply if I continue to take 100 mg daily.       I remember from our recent video appointment you mentioned that if the Lisinopril works, there may be some adjustment in the 3 medications I am taking.  I have had some very minimal cramping since starting the Lisinopril, but very tolerable.     Can you let me know your thoughts on whether I should remain on this course of medication, and if so, then I would need a refill on the Spironolatone.     Thanks!  Damion    Will route to Dr. Moctezuma to review/advise. Pended prescription for spironolactone 100 mg. Please sign if patient should remain at that dose. Please also discontinue the 50 mg prescription from medication list.    Mendy SINCLAIR RN

## 2020-05-27 ENCOUNTER — MYC MEDICAL ADVICE (OUTPATIENT)
Dept: PEDIATRICS | Facility: CLINIC | Age: 60
End: 2020-05-27

## 2020-05-27 DIAGNOSIS — I10 BENIGN ESSENTIAL HYPERTENSION: ICD-10-CM

## 2020-05-27 LAB
ANION GAP SERPL CALCULATED.3IONS-SCNC: 11 MMOL/L (ref 3–14)
BUN SERPL-MCNC: 17 MG/DL (ref 7–30)
CALCIUM SERPL-MCNC: 8.9 MG/DL (ref 8.5–10.1)
CHLORIDE SERPL-SCNC: 104 MMOL/L (ref 94–109)
CO2 SERPL-SCNC: 19 MMOL/L (ref 20–32)
CREAT SERPL-MCNC: 0.88 MG/DL (ref 0.52–1.04)
GFR SERPL CREATININE-BSD FRML MDRD: 71 ML/MIN/{1.73_M2}
GLUCOSE SERPL-MCNC: 83 MG/DL (ref 70–99)
POTASSIUM SERPL-SCNC: 4.1 MMOL/L (ref 3.4–5.3)
SODIUM SERPL-SCNC: 134 MMOL/L (ref 133–144)

## 2020-05-27 PROCEDURE — 80048 BASIC METABOLIC PNL TOTAL CA: CPT | Performed by: PEDIATRICS

## 2020-05-27 PROCEDURE — 36415 COLL VENOUS BLD VENIPUNCTURE: CPT | Performed by: PEDIATRICS

## 2020-05-27 RX ORDER — SPIRONOLACTONE 50 MG/1
50 TABLET, FILM COATED ORAL DAILY
Qty: 90 TABLET | Refills: 0 | Status: SHIPPED | OUTPATIENT
Start: 2020-05-27 | End: 2020-06-22

## 2020-06-03 ENCOUNTER — MYC MEDICAL ADVICE (OUTPATIENT)
Dept: PEDIATRICS | Facility: CLINIC | Age: 60
End: 2020-06-03

## 2020-06-03 NOTE — TELEPHONE ENCOUNTER
Dr. Moctezuma,    Please see MC message from patient    Thank you  Flores Hill RN on 6/3/2020 at 12:20 PM

## 2020-06-22 ENCOUNTER — E-VISIT (OUTPATIENT)
Dept: PEDIATRICS | Facility: CLINIC | Age: 60
End: 2020-06-22

## 2020-06-22 ENCOUNTER — VIRTUAL VISIT (OUTPATIENT)
Dept: PEDIATRICS | Facility: CLINIC | Age: 60
End: 2020-06-22
Payer: MEDICAID

## 2020-06-22 DIAGNOSIS — I10 BENIGN ESSENTIAL HYPERTENSION: Primary | ICD-10-CM

## 2020-06-22 DIAGNOSIS — Z53.9 ERRONEOUS ENCOUNTER--DISREGARD: Primary | ICD-10-CM

## 2020-06-22 PROCEDURE — 99213 OFFICE O/P EST LOW 20 MIN: CPT | Mod: 95 | Performed by: PEDIATRICS

## 2020-06-22 NOTE — PATIENT INSTRUCTIONS
Adithya Bruno,    Nice to see you today.    I'm so glad the blood pressure looks good without the spironolactone.    Decrease the metoprolol to 50mg x 1 week, then 25mg x 1 week, the hopefully off!  Send me a mychart with updates.    Take care,    Anushka Moctezuma MD  Internal Medicine/Pediatrics  [facility]

## 2020-06-22 NOTE — PROGRESS NOTES
"Damion Stanley is a 59 year old female who is being evaluated via a billable video visit.      The patient has been notified of following:     \"This video visit will be conducted via a call between you and your physician/provider. We have found that certain health care needs can be provided without the need for an in-person physical exam.  This service lets us provide the care you need with a video conversation.  If a prescription is necessary we can send it directly to your pharmacy.  If lab work is needed we can place an order for that and you can then stop by our lab to have the test done at a later time.    Video visits are billed at different rates depending on your insurance coverage.  Please reach out to your insurance provider with any questions.    If during the course of the call the physician/provider feels a video visit is not appropriate, you will not be charged for this service.\"    Patient has given verbal consent for Video visit? Yes    Will anyone else be joining your video visit? No    Subjective     Damion Stanley is a 59 year old female who presents today via video visit for the following health issues:    HPI  Hypertension Follow-up      Do you check your blood pressure regularly outside of the clinic? Yes   6/4: 123/89  6/5: 121/83  6/6: 124/83  6/7: 121/81  6/8 133/86  6/9: 114/76  6/10: 103/75  I stopped the Spironolactone this day.  6/11: 127/83  6/12: 122/80  6/13: 125/82  6/14: 120/71  6/15: 121/85  6/16: 123/81  6/17: No reading  6/18: 119/83  6/19: 108/79  6/20: No reading  6/21: 116/86    Takes BP between 9-10pm and heart rate usually 60-70. Had some initial cramping with the lisinopril, but improved with hydration.            Are you following a low salt diet? Yes    Are your blood pressures ever more than 140 on the top number (systolic) OR more   than 90 on the bottom number (diastolic), for example 140/90? No      How many servings of fruits and vegetables do you eat daily?  4 or " "more    On average, how many sweetened beverages do you drink each day (Examples: soda, juice, sweet tea, etc.  Do NOT count diet or artificially sweetened beverages)?   0    How many days per week do you exercise enough to make your heart beat faster? 5    How many minutes a day do you exercise enough to make your heart beat faster? 30 - 60    How many days per week do you miss taking your medication? 0         Video Start Time: 2:55pm    Reviewed and updated as needed this visit by Provider         Review of Systems   Constitutional, HEENT, cardiovascular, pulmonary, gi and gu systems are negative, except as otherwise noted.      Objective    LMP 01/07/2006   Estimated body mass index is 32.28 kg/m  as calculated from the following:    Height as of 11/13/19: 1.651 m (5' 5\").    Weight as of 11/13/19: 88 kg (194 lb).  Physical Exam     GENERAL: Healthy, alert and no distress  EYES: Eyes grossly normal to inspection.  No discharge or erythema, or obvious scleral/conjunctival abnormalities.  RESP: No audible wheeze, cough, or visible cyanosis.  No visible retractions or increased work of breathing.    SKIN: Visible skin clear. No significant rash, abnormal pigmentation or lesions.  NEURO: Cranial nerves grossly intact.  Mentation and speech appropriate for age.  PSYCH: Mentation appears normal, affect normal/bright, judgement and insight intact, normal speech and appearance well-groomed.      Diagnostic Test Results:  Labs reviewed in Epic        Assessment & Plan     1. Benign essential hypertension  Stable off spironolactone. See PI.  Patient doing very well on lisinopril and able to manage cramps with hydration.    Of note, patient insurance changing and she will not be able to stay within  - unsure when change will happen.    Patient Instructions   Adithya Bruno,    Nice to see you today.    I'm so glad the blood pressure looks good without the spironolactone.    Decrease the metoprolol to 50mg x 1 week, then 25mg x " 1 week, the hopefully off!  Send me a mychart with updates.    Take care,    Anushka Moctezuma MD  Internal Medicine/Pediatrics  [facility]           No follow-ups on file.    Anushka Moctezuma MD  [facility]      Video-Visit Details    Type of service:  Video Visit    Video End Time:3:05pm    Originating Location (pt. Location): Home    Distant Location (provider location):  [facility]     Platform used for Video Visit: Aashish    No follow-ups on file.       Anushka Moctezuma MD

## 2020-06-26 ENCOUNTER — MYC MEDICAL ADVICE (OUTPATIENT)
Dept: PEDIATRICS | Facility: CLINIC | Age: 60
End: 2020-06-26

## 2020-06-26 NOTE — TELEPHONE ENCOUNTER
Please review [employer] message encounter from 6/26/20.    Patient sent in log of blood pressures, which are elevated.     Patient has decreased metoprolol dosage from 75 mg to 50 mg daily since discussed during the visit on 6/22/20.     Should the patient increase metoprolol dosage back again to 75 mg daily?    [employer] message sent to the patient on 6/26/20.     Routing to Dr. Moctezuma to advise.    Vikram Castano RN on 6/26/2020 at 4:39 PM

## 2020-07-09 ENCOUNTER — MYC MEDICAL ADVICE (OUTPATIENT)
Dept: PEDIATRICS | Facility: CLINIC | Age: 60
End: 2020-07-09

## 2020-07-09 NOTE — TELEPHONE ENCOUNTER
Please see  Message:    Hi Dr. Moctezuma, here are my blood pressure results, based on reducing the Metoprolol to 50 mg and increasing the Lisinopril to 20 mg.  6/29: 159/101  6/30: 140/96  7/1: 122/82  7/2:116/88  7/3: 135/85  7/4: 140/97  7/5: 147/98  7/6: 143/93  7/7: 135/93     Thanks for your thoughts on this.  Damion    Last virtual visit 6/22/20:    1. Benign essential hypertension  Stable off spironolactone. See PI.  Patient doing very well on lisinopril and able to manage cramps with hydration.     Of note, patient insurance changing and she will not be able to stay within [facility] - unsure when change will happen.    Will route to Dr. Moctezuma to review/advise.     Mendy SINCLAIR RN

## 2020-07-09 NOTE — TELEPHONE ENCOUNTER
Diastolics still high.      Have her set up a phone or FTF visit to discuss.    Jack Diaz MD  Internal Medicine and Pediatrics

## 2020-07-09 NOTE — TELEPHONE ENCOUNTER
Spoke to patient. She stated that she really doesn't feel a visit is necessary and did not want to schedule an appt. Patient stated that she just wants to know if she can go back to the previous dosage of metoprolol that she was taking as her readings were good then. Please advise.    Katharine Terrazas,

## 2020-07-16 ENCOUNTER — MYC MEDICAL ADVICE (OUTPATIENT)
Dept: PEDIATRICS | Facility: CLINIC | Age: 60
End: 2020-07-16

## 2020-07-16 DIAGNOSIS — I10 BENIGN ESSENTIAL HYPERTENSION: Primary | ICD-10-CM

## 2020-07-16 RX ORDER — LISINOPRIL 10 MG/1
10 TABLET ORAL DAILY
Qty: 90 TABLET | Refills: 1 | Status: SHIPPED | OUTPATIENT
Start: 2020-07-16

## 2020-07-16 RX ORDER — LISINOPRIL 20 MG/1
20 TABLET ORAL DAILY
Qty: 90 TABLET | Refills: 1 | Status: SHIPPED | OUTPATIENT
Start: 2020-07-16

## 2020-07-16 NOTE — TELEPHONE ENCOUNTER
Please see MC message from patient:    Hi Dr. Moctezuma, since increasing the Metoprolol, my bp has been better.   I am in need of a refill of the Lisinopril before I go on vacation next week.  My current Rx has no refill without Dr. em on it.  I get prescriptions at the [facility] in [place].     BP results for the last week are:  7/9: 129/87  7/10: 121/86  7/11: 139/86  7/12: 116/84  7/13:140/93  7/14: 138/87  7/15: 133/91    Will route to Dr. Moctezuma to review. Patient was increased to metoprolol 75 mg daily on 7/9 based on updated bp readings (see first MC encounter on 7/9). New updated BP readings above. Patient was increased to lisinopril 20 mg on 6/29. Not sure if you want patient to continue on 20 mg lisinopril, or decrease since metoprolol was increased. Please advise and send in new prescription.    Mendy SINCLAIR RN

## 2020-07-16 NOTE — TELEPHONE ENCOUNTER
Please see MC message from patient:    Adithya Moctezuma, just to verify.   When I increased back to the Metoprolol 75 mg per day, I went back to 10 mg of Lisinopril per day since that was the dosage that had worked well.  Should I be taking 10 mg or 20 mg of Lisinopril per day?   I just want to ensure I am taking the appropriate dosage. Thanks!    Will route to Dr. Moctezuma to review/advise.    Mendy SINCLAIR RN

## 2021-01-15 ENCOUNTER — HEALTH MAINTENANCE LETTER (OUTPATIENT)
Age: 61
End: 2021-01-15

## 2021-03-20 ENCOUNTER — HEALTH MAINTENANCE LETTER (OUTPATIENT)
Age: 61
End: 2021-03-20

## 2021-03-24 ENCOUNTER — IMMUNIZATION (OUTPATIENT)
Dept: PEDIATRICS | Facility: CLINIC | Age: 61
End: 2021-03-24
Payer: COMMERCIAL

## 2021-03-24 PROCEDURE — 91300 PR COVID VAC PFIZER DIL RECON 30 MCG/0.3 ML IM: CPT

## 2021-03-24 PROCEDURE — 0001A PR COVID VAC PFIZER DIL RECON 30 MCG/0.3 ML IM: CPT

## 2021-04-14 ENCOUNTER — IMMUNIZATION (OUTPATIENT)
Dept: PEDIATRICS | Facility: CLINIC | Age: 61
End: 2021-04-14
Attending: INTERNAL MEDICINE
Payer: COMMERCIAL

## 2021-04-14 PROCEDURE — 91300 PR COVID VAC PFIZER DIL RECON 30 MCG/0.3 ML IM: CPT

## 2021-04-14 PROCEDURE — 0002A PR COVID VAC PFIZER DIL RECON 30 MCG/0.3 ML IM: CPT

## 2021-09-04 ENCOUNTER — HEALTH MAINTENANCE LETTER (OUTPATIENT)
Age: 61
End: 2021-09-04

## 2022-02-19 ENCOUNTER — HEALTH MAINTENANCE LETTER (OUTPATIENT)
Age: 62
End: 2022-02-19

## 2022-04-16 ENCOUNTER — HEALTH MAINTENANCE LETTER (OUTPATIENT)
Age: 62
End: 2022-04-16

## 2022-10-22 ENCOUNTER — HEALTH MAINTENANCE LETTER (OUTPATIENT)
Age: 62
End: 2022-10-22

## 2023-04-01 ENCOUNTER — HEALTH MAINTENANCE LETTER (OUTPATIENT)
Age: 63
End: 2023-04-01

## 2023-06-01 ENCOUNTER — HEALTH MAINTENANCE LETTER (OUTPATIENT)
Age: 63
End: 2023-06-01

## (undated) DEVICE — KIT ENDO TURNOVER/PROCEDURE W/CLEAN A SCOPE LINERS 103888

## (undated) RX ORDER — FENTANYL CITRATE 50 UG/ML
INJECTION, SOLUTION INTRAMUSCULAR; INTRAVENOUS
Status: DISPENSED
Start: 2019-11-13